# Patient Record
Sex: FEMALE | Race: WHITE | Employment: UNEMPLOYED | URBAN - METROPOLITAN AREA
[De-identification: names, ages, dates, MRNs, and addresses within clinical notes are randomized per-mention and may not be internally consistent; named-entity substitution may affect disease eponyms.]

---

## 2017-02-24 ENCOUNTER — OFFICE VISIT (OUTPATIENT)
Dept: INTERNAL MEDICINE CLINIC | Facility: CLINIC | Age: 52
End: 2017-02-24

## 2017-02-24 VITALS
SYSTOLIC BLOOD PRESSURE: 122 MMHG | DIASTOLIC BLOOD PRESSURE: 78 MMHG | HEART RATE: 78 BPM | WEIGHT: 166 LBS | HEIGHT: 66 IN | RESPIRATION RATE: 16 BRPM | BODY MASS INDEX: 26.68 KG/M2

## 2017-02-24 DIAGNOSIS — E66.9 OBESITY (BMI 30-39.9): ICD-10-CM

## 2017-02-24 DIAGNOSIS — Z51.81 THERAPEUTIC DRUG MONITORING: Primary | ICD-10-CM

## 2017-02-24 PROCEDURE — 99213 OFFICE O/P EST LOW 20 MIN: CPT | Performed by: INTERNAL MEDICINE

## 2017-02-24 RX ORDER — PHENTERMINE HYDROCHLORIDE 37.5 MG/1
37.5 TABLET ORAL
Qty: 30 TABLET | Refills: 0 | Status: SHIPPED | OUTPATIENT
Start: 2017-02-24 | End: 2017-03-21

## 2017-02-24 NOTE — PROGRESS NOTES
CC: Patient presents with:  Weight Check: up 10 lb       HPI:   Obesity, doing well on phentermine, contrave and topamax. No chest pain, worsening hunger.        Current Outpatient Prescriptions:  Phentermine HCl 37.5 MG Oral Tab Take 1 tablet (37.5 m Phentermine HCl 37.5 MG Oral Tab 30 tablet 0      Sig: Take 1 tablet (37.5 mg total) by mouth every morning before breakfast.          None     ASSESSMENT:   Therapeutic drug monitoring  (primary encounter diagnosis)  Obesity (BMI 30-39. 9)     PLAN:  1.  Ob

## 2017-03-21 ENCOUNTER — OFFICE VISIT (OUTPATIENT)
Dept: INTERNAL MEDICINE CLINIC | Facility: CLINIC | Age: 52
End: 2017-03-21

## 2017-03-21 VITALS
RESPIRATION RATE: 16 BRPM | HEIGHT: 66 IN | BODY MASS INDEX: 26.2 KG/M2 | DIASTOLIC BLOOD PRESSURE: 68 MMHG | SYSTOLIC BLOOD PRESSURE: 118 MMHG | WEIGHT: 163 LBS | HEART RATE: 78 BPM

## 2017-03-21 DIAGNOSIS — G47.33 OSA (OBSTRUCTIVE SLEEP APNEA): ICD-10-CM

## 2017-03-21 DIAGNOSIS — Z51.81 ENCOUNTER FOR THERAPEUTIC DRUG MONITORING: Primary | ICD-10-CM

## 2017-03-21 DIAGNOSIS — E66.9 OBESITY (BMI 30-39.9): ICD-10-CM

## 2017-03-21 PROCEDURE — 99213 OFFICE O/P EST LOW 20 MIN: CPT | Performed by: INTERNAL MEDICINE

## 2017-03-21 RX ORDER — PHENTERMINE HYDROCHLORIDE 37.5 MG/1
37.5 TABLET ORAL
Qty: 30 TABLET | Refills: 0 | Status: SHIPPED | OUTPATIENT
Start: 2017-03-21 | End: 2017-04-28

## 2017-03-21 NOTE — PROGRESS NOTES
CC: Patient presents with:  Weight Check: down 3 lb       HPI:   Obesity, doing well on phentermine, topamax and contrave. No chest pain.        Current Outpatient Prescriptions:  Phentermine HCl 37.5 MG Oral Tab Take 1 tablet (37.5 mg total) by mouth Take 1 tablet (37.5 mg total) by mouth every morning before breakfast.      Naltrexone-Bupropion HCl ER (CONTRAVE) 8-90 MG Oral Tablet 12 Hr 120 tablet 5      Sig: Take 2 tablets by mouth 2 (two) times daily.           None     ASSESSMENT:   Encounter for t

## 2017-04-28 ENCOUNTER — OFFICE VISIT (OUTPATIENT)
Dept: INTERNAL MEDICINE CLINIC | Facility: CLINIC | Age: 52
End: 2017-04-28

## 2017-04-28 VITALS
SYSTOLIC BLOOD PRESSURE: 124 MMHG | BODY MASS INDEX: 24.91 KG/M2 | HEART RATE: 78 BPM | WEIGHT: 155 LBS | RESPIRATION RATE: 16 BRPM | DIASTOLIC BLOOD PRESSURE: 68 MMHG | HEIGHT: 66 IN

## 2017-04-28 DIAGNOSIS — Z51.81 ENCOUNTER FOR THERAPEUTIC DRUG MONITORING: Primary | ICD-10-CM

## 2017-04-28 DIAGNOSIS — I10 ESSENTIAL HYPERTENSION: ICD-10-CM

## 2017-04-28 DIAGNOSIS — E66.9 OBESITY (BMI 30-39.9): ICD-10-CM

## 2017-04-28 DIAGNOSIS — G47.33 OSA (OBSTRUCTIVE SLEEP APNEA): ICD-10-CM

## 2017-04-28 PROCEDURE — 99213 OFFICE O/P EST LOW 20 MIN: CPT | Performed by: INTERNAL MEDICINE

## 2017-04-28 RX ORDER — TOPIRAMATE 50 MG/1
50 TABLET, FILM COATED ORAL 2 TIMES DAILY
Qty: 60 TABLET | Refills: 5 | Status: SHIPPED | OUTPATIENT
Start: 2017-04-28 | End: 2017-08-25

## 2017-04-28 RX ORDER — PHENTERMINE HYDROCHLORIDE 37.5 MG/1
37.5 TABLET ORAL
Qty: 30 TABLET | Refills: 0 | Status: SHIPPED | OUTPATIENT
Start: 2017-04-28 | End: 2017-08-25

## 2017-04-28 NOTE — PROGRESS NOTES
CC: Patient presents with:  Weight Check: down 8 lb       HPI:   1. Obesity, doing well on phentermine, contrave and topamax. No chest pain. Working out well. Current Outpatient Prescriptions:  Phentermine HCl 37.5 MG Oral Tab Take 1 tablet (37. times daily. None     ASSESSMENT:   Encounter for therapeutic drug monitoring  (primary encounter diagnosis)  Obesity (BMI 30-39.9)  SHA (obstructive sleep apnea)  Essential hypertension     PLAN:  1.  Obesity (BMI 30-39.9)/SHA, pt with 11 lbs wt l

## 2017-05-25 ENCOUNTER — OFFICE VISIT (OUTPATIENT)
Dept: INTERNAL MEDICINE CLINIC | Facility: CLINIC | Age: 52
End: 2017-05-25

## 2017-05-25 VITALS
RESPIRATION RATE: 16 BRPM | WEIGHT: 151 LBS | BODY MASS INDEX: 24.27 KG/M2 | HEART RATE: 68 BPM | DIASTOLIC BLOOD PRESSURE: 70 MMHG | HEIGHT: 66 IN | SYSTOLIC BLOOD PRESSURE: 124 MMHG

## 2017-05-25 DIAGNOSIS — E66.9 OBESITY (BMI 30-39.9): ICD-10-CM

## 2017-05-25 DIAGNOSIS — G47.33 OSA (OBSTRUCTIVE SLEEP APNEA): ICD-10-CM

## 2017-05-25 DIAGNOSIS — Z51.81 ENCOUNTER FOR THERAPEUTIC DRUG MONITORING: Primary | ICD-10-CM

## 2017-05-25 PROCEDURE — 99213 OFFICE O/P EST LOW 20 MIN: CPT | Performed by: INTERNAL MEDICINE

## 2017-05-25 RX ORDER — PHENTERMINE HYDROCHLORIDE 15 MG/1
15 CAPSULE ORAL EVERY MORNING
Qty: 30 CAPSULE | Refills: 2 | Status: SHIPPED | OUTPATIENT
Start: 2017-05-25 | End: 2017-10-03

## 2017-05-25 NOTE — PROGRESS NOTES
CC: Patient presents with:  Weight Check: down 4 lb       HPI:   Obesity, doing well on phentermine, contrave and topamax. Working out great. No chest pain.        Current Outpatient Prescriptions:  Phentermine HCl 15 MG Oral Cap Take 1 capsule (15 mg None     ASSESSMENT:   Encounter for therapeutic drug monitoring  (primary encounter diagnosis)  Obesity (BMI 30-39.9)  SHA (obstructive sleep apnea)     PLAN:  1.  Obesity (BMI 30-39.9)/SHA, pt with 15 lbs wt loss on 3 months of phentermine 37.5mg, contr

## 2017-08-25 ENCOUNTER — OFFICE VISIT (OUTPATIENT)
Dept: INTERNAL MEDICINE CLINIC | Facility: CLINIC | Age: 52
End: 2017-08-25

## 2017-08-25 VITALS
SYSTOLIC BLOOD PRESSURE: 120 MMHG | DIASTOLIC BLOOD PRESSURE: 80 MMHG | HEART RATE: 78 BPM | WEIGHT: 158 LBS | RESPIRATION RATE: 16 BRPM | BODY MASS INDEX: 25.39 KG/M2 | HEIGHT: 66 IN

## 2017-08-25 DIAGNOSIS — Z51.81 ENCOUNTER FOR THERAPEUTIC DRUG MONITORING: Primary | ICD-10-CM

## 2017-08-25 DIAGNOSIS — E66.9 OBESITY (BMI 30-39.9): ICD-10-CM

## 2017-08-25 PROCEDURE — 99213 OFFICE O/P EST LOW 20 MIN: CPT | Performed by: INTERNAL MEDICINE

## 2017-08-25 RX ORDER — TOPIRAMATE 50 MG/1
50 TABLET, FILM COATED ORAL 2 TIMES DAILY
Qty: 60 TABLET | Refills: 5 | Status: SHIPPED | OUTPATIENT
Start: 2017-08-25 | End: 2018-01-31

## 2017-08-25 RX ORDER — PHENTERMINE HYDROCHLORIDE 37.5 MG/1
37.5 TABLET ORAL
Qty: 30 TABLET | Refills: 0 | Status: SHIPPED | OUTPATIENT
Start: 2017-08-25 | End: 2017-09-19

## 2017-08-25 NOTE — PROGRESS NOTES
CC: Patient presents with:  Weight Check: up 7 lb       HPI:   Obesity, doing well on phentermine, contrave and topamax. Worsening hunger and cravings, went on vacation to [place].        Current Outpatient Prescriptions:  topiramate 50 MG Oral Tab [facility] tablet 5      Sig: Take 1 tablet (50 mg total) by mouth 2 (two) times daily. Naltrexone-Bupropion HCl ER (CONTRAVE) 8-90 MG Oral Tablet 12 Hr 120 tablet 5      Sig: Take 2 tablets by mouth 2 (two) times daily.       Phentermine HCl 37.5 MG Oral Tab 30

## 2017-09-19 DIAGNOSIS — E66.9 OBESITY (BMI 30-39.9): ICD-10-CM

## 2017-09-19 DIAGNOSIS — Z51.81 ENCOUNTER FOR THERAPEUTIC DRUG MONITORING: ICD-10-CM

## 2017-09-20 NOTE — TELEPHONE ENCOUNTER
Requesting Phentermine HCl 37.5 MG   LOV: 8/25/17  RTC: 4 weeks  Last Labs:   Filled: 8/25/17 #30 with 0 refills    Future Appointments  Date Time Provider Francisco Javier Foster   10/3/2017 11:00 AM [facility]     Last seen by

## 2017-09-21 NOTE — TELEPHONE ENCOUNTER
Verbal given to pharmacy. Pended as phone in for documentation purposes.      Time started: 1014    Time ended: 1015    Total time spent on chart: 1 min

## 2017-09-22 RX ORDER — PHENTERMINE HYDROCHLORIDE 37.5 MG/1
TABLET ORAL
Qty: 30 TABLET | Refills: 0 | OUTPATIENT
Start: 2017-09-22 | End: 2017-10-03

## 2017-10-03 ENCOUNTER — OFFICE VISIT (OUTPATIENT)
Dept: INTERNAL MEDICINE CLINIC | Facility: CLINIC | Age: 52
End: 2017-10-03

## 2017-10-03 VITALS
BODY MASS INDEX: 24.91 KG/M2 | RESPIRATION RATE: 16 BRPM | HEART RATE: 68 BPM | DIASTOLIC BLOOD PRESSURE: 90 MMHG | SYSTOLIC BLOOD PRESSURE: 132 MMHG | HEIGHT: 66 IN | WEIGHT: 155 LBS

## 2017-10-03 DIAGNOSIS — Z51.81 ENCOUNTER FOR THERAPEUTIC DRUG MONITORING: Primary | ICD-10-CM

## 2017-10-03 DIAGNOSIS — E03.9 HYPOTHYROIDISM, UNSPECIFIED TYPE: ICD-10-CM

## 2017-10-03 DIAGNOSIS — I10 ESSENTIAL HYPERTENSION: ICD-10-CM

## 2017-10-03 DIAGNOSIS — E66.9 OBESITY (BMI 30-39.9): ICD-10-CM

## 2017-10-03 PROCEDURE — 99214 OFFICE O/P EST MOD 30 MIN: CPT | Performed by: NURSE PRACTITIONER

## 2017-10-03 RX ORDER — PHENTERMINE HYDROCHLORIDE 37.5 MG/1
37.5 TABLET ORAL
Qty: 30 TABLET | Refills: 0 | Status: SHIPPED | OUTPATIENT
Start: 2017-10-03 | End: 2017-11-01

## 2017-10-03 NOTE — PROGRESS NOTES
Bernardo Martinez is a 46year old female presents today for follow-up on medical weight loss program for the treatment of overweight, obesity, or morbid obesity. Previous patient of Dr. Cassandra Brennan and [facility] client since 1/12/2016.     Patient has lost 1# since LOV 1 intact  PSYCH: pleasant, cooperative, normal mood and affect    ASSESSMENT AND PLAN:  Encounter for therapeutic drug monitoring  (primary encounter diagnosis)  Obesity (bmi 30-39. 9)  Essential hypertension  Hypothyroidism, unspecified type    No orders of Sherley Rollins [phone] [email or web address]. [employer]ur[employer]rex  · 360FIT Livia    Online  · Fitness  on Curiously in 10 DVD series  www. [employer]. [employer]    Apps  · Aaptiv  · [place] 7 Minute Workout    Nutrition  · MyFitness Pal  · LoseIT!   · FitFoundation (hea different from when you were younger. An increase in visceral (abdominal) fat is linked to an increase in insulin resistance, diabetes, and inflammatory diseases.     Another factor contributing to weight gain in perimenopause may be the increased appetite friend, ordering the lighter portion when available, or put half in the takeout box right away. Swap out dessert for fruit or yogurt. Reduce carbs.  Cut back on carbs in order to reduce the increase in belly fat, which drives metabolic problems   Add fibe

## 2017-11-01 ENCOUNTER — OFFICE VISIT (OUTPATIENT)
Dept: INTERNAL MEDICINE CLINIC | Facility: CLINIC | Age: 52
End: 2017-11-01

## 2017-11-01 VITALS
HEIGHT: 66 IN | RESPIRATION RATE: 16 BRPM | HEART RATE: 80 BPM | BODY MASS INDEX: 24.75 KG/M2 | DIASTOLIC BLOOD PRESSURE: 80 MMHG | SYSTOLIC BLOOD PRESSURE: 140 MMHG | WEIGHT: 154 LBS

## 2017-11-01 DIAGNOSIS — I10 ESSENTIAL HYPERTENSION: ICD-10-CM

## 2017-11-01 DIAGNOSIS — Z51.81 ENCOUNTER FOR THERAPEUTIC DRUG MONITORING: Primary | ICD-10-CM

## 2017-11-01 DIAGNOSIS — E66.9 OBESITY (BMI 30-39.9): ICD-10-CM

## 2017-11-01 PROCEDURE — 99213 OFFICE O/P EST LOW 20 MIN: CPT | Performed by: NURSE PRACTITIONER

## 2017-11-01 RX ORDER — PHENTERMINE HYDROCHLORIDE 37.5 MG/1
37.5 TABLET ORAL
Qty: 30 TABLET | Refills: 0 | Status: SHIPPED | OUTPATIENT
Start: 2017-11-01 | End: 2017-11-29

## 2017-11-01 RX ORDER — METFORMIN HYDROCHLORIDE 750 MG/1
750 TABLET, EXTENDED RELEASE ORAL DAILY
Qty: 30 TABLET | Refills: 1 | Status: SHIPPED | OUTPATIENT
Start: 2017-11-01 | End: 2017-11-29

## 2017-11-01 NOTE — PATIENT INSTRUCTIONS
Congratulations on your 1# weight loss! Continue making lifestyle changes that focus on good nutrition, regular exercise and stress management. Medication Plan: Continue phentermine and Contrave at current dose.  Add Metformin  mg daily with meal. time to exercise—it is your life!   Feel good about yourself  Do you eat more because you feel bad about yourself, then feel even worse as you gain weight? This is a “vicious cycle.” Breaking this cycle is not easy.  You may need group support or counseling

## 2017-11-01 NOTE — PROGRESS NOTES
Brittany Chauhan is a 46year old female presents today for follow-up on medical weight loss program for the treatment of overweight, obesity, or morbid obesity with associated HTN.     S:  Current weight Wt Readings from Last 6 Encounters:  11/01/17 : 154 lb Signed Prescriptions Disp Refills    Phentermine HCl 37.5 MG Oral Tab 30 tablet 0      Sig: Take 1 tablet (37.5 mg total) by mouth every morning before breakfast.      Naltrexone-Bupropion HCl ER (CONTRAVE) 8-90 MG Oral Tablet 12 Hr 120 tablet 5      Sig: Barrier 1: I don’t want to deny myself. Barrier Buster: You don’t have to! Moderation is the key:  · Watch portion sizes and know when you're eating more than one serving. · Plan to ask for a doggy bag when you eat out. · Have just one.   · Choose lower- © 5492-5219 [address]. [address] [facility], [facility] [place] [facility]. All rights reserved. This information is not intended as a substitute for professional medical care. Always follow your healthcare professional's instructions.             Med

## 2017-11-08 ENCOUNTER — TELEPHONE (OUTPATIENT)
Dept: INTERNAL MEDICINE CLINIC | Facility: CLINIC | Age: 52
End: 2017-11-08

## 2017-11-08 NOTE — TELEPHONE ENCOUNTER
Received documentation from [address] [place], [place] [address] stating jane needs a prior authorization. PA submitted to [facility]. Your demographic data has been sent to [facility] successfully.  They will respond shortly with your clinical questions and you will be notified by

## 2017-11-09 NOTE — TELEPHONE ENCOUNTER
Received documentation from [facility] that Contrave was approved. Medication was approved from 11/9/17-11/9/20. Called and notified Alejandra Munroe. Copy of approval put in blue folder and sent to scan.

## 2017-11-09 NOTE — TELEPHONE ENCOUNTER
Questions were filled out on CMM and submitted. Your information has been submitted to [facility]. To check for an updated outcome later, reopen this PA request from your dashboard.   If [facility] has not responded to your request within 24 hours, contact Car

## 2017-11-29 ENCOUNTER — OFFICE VISIT (OUTPATIENT)
Dept: INTERNAL MEDICINE CLINIC | Facility: CLINIC | Age: 52
End: 2017-11-29

## 2017-11-29 VITALS
WEIGHT: 152 LBS | DIASTOLIC BLOOD PRESSURE: 90 MMHG | BODY MASS INDEX: 24.43 KG/M2 | RESPIRATION RATE: 16 BRPM | HEIGHT: 66 IN | HEART RATE: 80 BPM | SYSTOLIC BLOOD PRESSURE: 130 MMHG

## 2017-11-29 DIAGNOSIS — E66.9 OBESITY (BMI 30-39.9): ICD-10-CM

## 2017-11-29 DIAGNOSIS — Z51.81 ENCOUNTER FOR THERAPEUTIC DRUG MONITORING: Primary | ICD-10-CM

## 2017-11-29 PROCEDURE — 99213 OFFICE O/P EST LOW 20 MIN: CPT | Performed by: NURSE PRACTITIONER

## 2017-11-29 RX ORDER — METFORMIN HYDROCHLORIDE 750 MG/1
1500 TABLET, EXTENDED RELEASE ORAL
Qty: 60 TABLET | Refills: 1 | Status: SHIPPED | OUTPATIENT
Start: 2017-11-29 | End: 2018-01-31

## 2017-11-29 RX ORDER — PHENTERMINE HYDROCHLORIDE 37.5 MG/1
37.5 TABLET ORAL
Qty: 30 TABLET | Refills: 0 | Status: CANCELLED | OUTPATIENT
Start: 2017-11-29

## 2017-11-29 RX ORDER — PHENTERMINE HYDROCHLORIDE 15 MG/1
15 CAPSULE ORAL EVERY MORNING
Qty: 30 CAPSULE | Refills: 1 | Status: SHIPPED | OUTPATIENT
Start: 2017-11-29 | End: 2018-01-31

## 2017-11-29 NOTE — PATIENT INSTRUCTIONS
Congratulations on your 2# weight loss! Continue making lifestyle changes that focus on good nutrition, regular exercise and stress management.     Medication Plan: Continue current medication regimen aside from reduce phentermine to 15 mg daily and increas that you can stick to your plan and meet your goals:  · If you don’t meet a goal, don’t use it as an excuse to give up on your whole plan. Adjust your goal and try again.   · Try to understand your own attitude about food.  Are you subject to emotional eati

## 2017-11-29 NOTE — PROGRESS NOTES
Princess Sosa is a 46year old female presents today for follow-up on medical weight loss program for the treatment of overweight, obesity, or morbid obesity with associated HTN.     S:  Current weight Wt Readings from Last 6 Encounters:  11/29/17 : 152 lb Prescriptions Disp Refills    MetFORMIN HCl  MG Oral Tablet 24 Hr 60 tablet 1      Sig: Take 2 tablets (1,500 mg total) by mouth daily with breakfast.      Phentermine HCl 15 MG Oral Cap 30 capsule 1      Sig: Take 1 capsule (15 mg total) by mouth ev keep you motivated:   · Remind yourself of your goals. Post them near the refrigerator or desk where you work. · Make daily entries in your diary or journal about your activity and eating.  A visual reminder of success, like a gold star, can help keep you instructions. Medication use and SEs reviewed with patient. Return in about 2 months (around 1/29/2018) for weight mangement. Patient verbalizes understanding.

## 2018-01-31 ENCOUNTER — OFFICE VISIT (OUTPATIENT)
Dept: INTERNAL MEDICINE CLINIC | Facility: CLINIC | Age: 53
End: 2018-01-31

## 2018-01-31 VITALS
WEIGHT: 154 LBS | HEART RATE: 68 BPM | SYSTOLIC BLOOD PRESSURE: 128 MMHG | DIASTOLIC BLOOD PRESSURE: 70 MMHG | HEIGHT: 66 IN | BODY MASS INDEX: 24.75 KG/M2 | RESPIRATION RATE: 16 BRPM

## 2018-01-31 DIAGNOSIS — Z51.81 ENCOUNTER FOR THERAPEUTIC DRUG MONITORING: Primary | ICD-10-CM

## 2018-01-31 DIAGNOSIS — E66.9 OBESITY (BMI 30-39.9): ICD-10-CM

## 2018-01-31 PROCEDURE — 99213 OFFICE O/P EST LOW 20 MIN: CPT | Performed by: NURSE PRACTITIONER

## 2018-01-31 RX ORDER — TOPIRAMATE 100 MG/1
100 TABLET, FILM COATED ORAL 2 TIMES DAILY
Qty: 60 TABLET | Refills: 5 | Status: SHIPPED | OUTPATIENT
Start: 2018-01-31 | End: 2018-09-17

## 2018-01-31 RX ORDER — METFORMIN HYDROCHLORIDE 750 MG/1
1500 TABLET, EXTENDED RELEASE ORAL
Qty: 60 TABLET | Refills: 1 | Status: SHIPPED | OUTPATIENT
Start: 2018-01-31 | End: 2018-03-12

## 2018-01-31 RX ORDER — PHENTERMINE HYDROCHLORIDE 15 MG/1
15 CAPSULE ORAL EVERY MORNING
Qty: 30 CAPSULE | Refills: 1 | Status: SHIPPED | OUTPATIENT
Start: 2018-01-31 | End: 2018-03-12

## 2018-01-31 NOTE — PROGRESS NOTES
Burgess Elias is a 46year old female presents today for follow-up on medical weight loss program for the treatment of overweight, obesity, or morbid obesity with associated HTN.     S:  Current weight Wt Readings from Last 6 Encounters:  01/31/18 : 154 lb encounter diagnosis)  Obesity (bmi 30-39. 9)    No orders of the defined types were placed in this encounter.       Meds & Refills for this Visit:  Signed Prescriptions Disp Refills    MetFORMIN HCl  MG Oral Tablet 24 Hr 60 tablet 1      Sig: Take 2 ta assistance. Is too little sleep a cause of weight gain? It might be. Recent studies have suggested an association between sleep duration and weight gain.  Sleeping less than five hours — or more than nine hours — a night appears to increase the [employer]

## 2018-01-31 NOTE — PATIENT INSTRUCTIONS
Continue making lifestyle changes that focus on good nutrition, regular exercise and stress management.     Medication Plan: Continue current medication regimen aside from increase Topamax to 100 mg twice a day, start with 50 mg in AM and 100 mg in PM for 1

## 2018-03-12 ENCOUNTER — OFFICE VISIT (OUTPATIENT)
Dept: INTERNAL MEDICINE CLINIC | Facility: CLINIC | Age: 53
End: 2018-03-12

## 2018-03-12 VITALS
RESPIRATION RATE: 16 BRPM | SYSTOLIC BLOOD PRESSURE: 140 MMHG | HEIGHT: 66 IN | WEIGHT: 154 LBS | HEART RATE: 68 BPM | DIASTOLIC BLOOD PRESSURE: 90 MMHG | BODY MASS INDEX: 24.75 KG/M2

## 2018-03-12 DIAGNOSIS — E66.9 OBESITY (BMI 30-39.9): ICD-10-CM

## 2018-03-12 DIAGNOSIS — I10 ESSENTIAL HYPERTENSION: ICD-10-CM

## 2018-03-12 DIAGNOSIS — Z51.81 ENCOUNTER FOR THERAPEUTIC DRUG MONITORING: Primary | ICD-10-CM

## 2018-03-12 PROCEDURE — 99213 OFFICE O/P EST LOW 20 MIN: CPT | Performed by: NURSE PRACTITIONER

## 2018-03-12 RX ORDER — METFORMIN HYDROCHLORIDE 750 MG/1
1500 TABLET, EXTENDED RELEASE ORAL
Qty: 60 TABLET | Refills: 5 | Status: SHIPPED | OUTPATIENT
Start: 2018-03-12 | End: 2018-09-17

## 2018-03-12 RX ORDER — PHENTERMINE HYDROCHLORIDE 15 MG/1
15 CAPSULE ORAL EVERY MORNING
Qty: 30 CAPSULE | Refills: 1 | Status: SHIPPED | OUTPATIENT
Start: 2018-03-12 | End: 2018-05-17

## 2018-03-12 NOTE — PROGRESS NOTES
Mindi Forte is a 46year old female presents today for follow-up on medical weight loss program for the treatment of overweight, obesity, or morbid obesity with associated HTN.     S:  Current weight Wt Readings from Last 6 Encounters:  03/12/18 : 154 lb Take 2 tablets (1,500 mg total) by mouth daily with breakfast.      Lorcaserin HCl ER (BELVIQ XR) 20 MG Oral Tablet 24 Hr 30 tablet 1      Sig: Take 1 tablet by mouth every morning.       Phentermine HCl 15 MG Oral Cap 30 capsule 1      Sig: Take 1 capsule

## 2018-03-12 NOTE — PATIENT INSTRUCTIONS
Continue making lifestyle changes that focus on good nutrition, regular exercise and stress management. Medication Plan: Continue current medication regimen. Agreed upon goal/s before next office visit include: Great job keeping self care present!  BP

## 2018-05-08 ENCOUNTER — TELEPHONE (OUTPATIENT)
Dept: INTERNAL MEDICINE CLINIC | Facility: CLINIC | Age: 53
End: 2018-05-08

## 2018-05-08 NOTE — TELEPHONE ENCOUNTER
Patient called at 1005am for her 11am appt - pt has to cx   Pt was called to  son from school - son is sick   Pt r/s for 5/15    No show charge?    Last no show was with EVER SAWYER [facility] 5/2017

## 2018-05-17 ENCOUNTER — OFFICE VISIT (OUTPATIENT)
Dept: INTERNAL MEDICINE CLINIC | Facility: CLINIC | Age: 53
End: 2018-05-17

## 2018-05-17 VITALS
SYSTOLIC BLOOD PRESSURE: 130 MMHG | BODY MASS INDEX: 24.91 KG/M2 | DIASTOLIC BLOOD PRESSURE: 90 MMHG | WEIGHT: 155 LBS | HEIGHT: 66 IN | HEART RATE: 80 BPM | RESPIRATION RATE: 16 BRPM

## 2018-05-17 DIAGNOSIS — I10 ESSENTIAL HYPERTENSION: ICD-10-CM

## 2018-05-17 DIAGNOSIS — L65.9 HAIR THINNING: ICD-10-CM

## 2018-05-17 DIAGNOSIS — E66.9 OBESITY (BMI 30-39.9): ICD-10-CM

## 2018-05-17 DIAGNOSIS — E03.9 HYPOTHYROIDISM, UNSPECIFIED TYPE: ICD-10-CM

## 2018-05-17 DIAGNOSIS — Z51.81 ENCOUNTER FOR THERAPEUTIC DRUG MONITORING: Primary | ICD-10-CM

## 2018-05-17 PROCEDURE — 99214 OFFICE O/P EST MOD 30 MIN: CPT | Performed by: NURSE PRACTITIONER

## 2018-05-17 RX ORDER — PHENTERMINE HYDROCHLORIDE 15 MG/1
15 CAPSULE ORAL EVERY MORNING
Qty: 30 CAPSULE | Refills: 1 | Status: SHIPPED | OUTPATIENT
Start: 2018-05-17 | End: 2018-09-17

## 2018-05-17 NOTE — PROGRESS NOTES
Gregory Browning is a 46year old female presents today for follow-up on medical weight loss program for the treatment of overweight, obesity, or morbid obesity with associated HTN.     S:  Current weight Wt Readings from Last 6 Encounters:  05/17/18 : 155 lb hypertension  Hypothyroidism, unspecified type  Hair thinning      Orders Placed This Encounter      COMP METABOLIC PANEL [20357]      TSH and Free T4      Vitamin B12      Vitamin D, 25-Hydroxy      Ferritin [E]    Meds & Refills for this Visit:  Signed P instructions below for additional plans and patient counseling. Patient Instructions   Continue making lifestyle changes that focus on good nutrition, regular exercise and stress management.     Medication Plan: Continue current medication regimen as including adrenaline, CRH, and cortisol. Your brain and body prepare to handle the threat by making you feel alert, ready for action and able to withstand an injury.  In the short-term, adrenaline helps you feel less hungry as your blood flows away from the mental and physical work dealing with the threat. Anxiety  When we have a surge of adrenaline as part of our fight/flight response, we get fidgety and activated. Adrenaline is the reason for the “wired up” feeling we get when we’re stressed.  While we may countries, and they also work more hours. Working in urban areas may mean long, jammed commutes, which both increase stress and interfere with willpower because we are hungrier when we get home later.  [address] research study showed, in Mindfully  Mindful Eating programs train you in meditation, which helps you cope with stress, and change your consciousness around eating. You learn to slow down and tune in to your sensory experience of the food, including its sight, texture or smell.  You

## 2018-05-17 NOTE — PATIENT INSTRUCTIONS
Continue making lifestyle changes that focus on good nutrition, regular exercise and stress management. Medication Plan: Continue current medication regimen aside from wean off of Contrave:  Take 2 tabs in AM, 1 tab in PM x5 days, then 1 tab twice a day making you feel alert, ready for action and able to withstand an injury.  In the short-term, adrenaline helps you feel less hungry as your blood flows away from the internal organs and to your large muscles to prepare for “fight or flight.” However, once th part of our fight/flight response, we get fidgety and activated. Adrenaline is the reason for the “wired up” feeling we get when we’re stressed.  While we may burn off some extra calories fidgeting or running around cleaning because we can’t sit still, anxi both increase stress and interfere with willpower because we are hungrier when we get home later.  [address] research study showed, in laboratory mice, that being “stressed” by exposure to the smell of a predator lead the mice to eat more your consciousness around eating. You learn to slow down and tune in to your sensory experience of the food, including its sight, texture or smell.  You also learn to tune into your subjective feelings of hunger or fullness, rather than eating just because

## 2018-05-18 ENCOUNTER — TELEPHONE (OUTPATIENT)
Dept: INTERNAL MEDICINE CLINIC | Facility: CLINIC | Age: 53
End: 2018-05-18

## 2018-05-18 NOTE — TELEPHONE ENCOUNTER
PA request for Tee requested, PA submitted to [facility]. Awaiting approval or denial.  Your demographic data has been sent to the plan successfully.  They will respond with your clinical questions and you will be notified by email when available within t

## 2018-05-22 ENCOUNTER — TELEPHONE (OUTPATIENT)
Dept: INTERNAL MEDICINE CLINIC | Facility: CLINIC | Age: 53
End: 2018-05-22

## 2018-05-22 NOTE — TELEPHONE ENCOUNTER
Plan has responded with another question set for the PA for saxenda that expires in 1 month    Key: [id number]

## 2018-05-22 NOTE — TELEPHONE ENCOUNTER
Plan has responded with another question set for the PA for saxenda that expires in 1 month    Wu: Kristin Renteria called [phone]

## 2018-05-23 NOTE — TELEPHONE ENCOUNTER
Additional questions answered and submitted for PA. They wanted to know if patient had a BMI equal or greater then 30 or a BMI of 27. Answered no to both questions. Patient has a BMI of 25.  Awaiting approval or denial. Your information has been submitted t

## 2018-05-23 NOTE — TELEPHONE ENCOUNTER
Received documentation from [facility] that Nilay Cid was denied. Medication denied due to patient not meeting criteria of having a BMI of 30 or greater or having a BMI of 27 or more and have risk factors. Letter sent to scan and copy in folder. Please advise.

## 2018-05-29 NOTE — TELEPHONE ENCOUNTER
Called and left detailed message to check with her insurance to see if Victoza or Bydureon is covered by her insurance since [address] is not

## 2018-09-07 ENCOUNTER — LAB ENCOUNTER (OUTPATIENT)
Dept: LAB | Age: 53
End: 2018-09-07
Attending: NURSE PRACTITIONER
Payer: COMMERCIAL

## 2018-09-07 DIAGNOSIS — E66.9 OBESITY, UNSPECIFIED: Primary | ICD-10-CM

## 2018-09-07 LAB
ALBUMIN SERPL-MCNC: 4 G/DL (ref 3.5–4.8)
ALBUMIN/GLOB SERPL: 1.1 {RATIO} (ref 1–2)
ALP LIVER SERPL-CCNC: 71 U/L (ref 41–108)
ALT SERPL-CCNC: 29 U/L (ref 14–54)
ANION GAP SERPL CALC-SCNC: 9 MMOL/L (ref 0–18)
AST SERPL-CCNC: 22 U/L (ref 15–41)
BILIRUB SERPL-MCNC: 0.7 MG/DL (ref 0.1–2)
BUN BLD-MCNC: 19 MG/DL (ref 8–20)
BUN/CREAT SERPL: 14.6 (ref 10–20)
CALCIUM BLD-MCNC: 9.4 MG/DL (ref 8.3–10.3)
CHLORIDE SERPL-SCNC: 106 MMOL/L (ref 101–111)
CO2 SERPL-SCNC: 26 MMOL/L (ref 22–32)
CREAT BLD-MCNC: 1.3 MG/DL (ref 0.55–1.02)
DEPRECATED HBV CORE AB SER IA-ACNC: 16 NG/ML (ref 18–340)
GLOBULIN PLAS-MCNC: 3.5 G/DL (ref 2.5–4)
GLUCOSE BLD-MCNC: 90 MG/DL (ref 70–99)
HAV AB SERPL IA-ACNC: 296 PG/ML (ref 193–986)
M PROTEIN MFR SERPL ELPH: 7.5 G/DL (ref 6.1–8.3)
OSMOLALITY SERPL CALC.SUM OF ELEC: 294 MOSM/KG (ref 275–295)
POTASSIUM SERPL-SCNC: 4.5 MMOL/L (ref 3.6–5.1)
SODIUM SERPL-SCNC: 141 MMOL/L (ref 136–144)
T4 FREE SERPL-MCNC: 0.7 NG/DL (ref 0.9–1.8)
TSI SER-ACNC: 24.2 MIU/ML (ref 0.35–5.5)
VIT D+METAB SERPL-MCNC: 18.1 NG/ML (ref 30–100)

## 2018-09-07 PROCEDURE — 84443 ASSAY THYROID STIM HORMONE: CPT

## 2018-09-07 PROCEDURE — 82607 VITAMIN B-12: CPT

## 2018-09-07 PROCEDURE — 84439 ASSAY OF FREE THYROXINE: CPT

## 2018-09-07 PROCEDURE — 82306 VITAMIN D 25 HYDROXY: CPT

## 2018-09-07 PROCEDURE — 80053 COMPREHEN METABOLIC PANEL: CPT

## 2018-09-07 PROCEDURE — 82728 ASSAY OF FERRITIN: CPT

## 2018-09-12 ENCOUNTER — TELEPHONE (OUTPATIENT)
Dept: INTERNAL MEDICINE CLINIC | Facility: CLINIC | Age: 53
End: 2018-09-12

## 2018-09-12 RX ORDER — ERGOCALCIFEROL 1.25 MG/1
50000 CAPSULE ORAL WEEKLY
Qty: 4 CAPSULE | Refills: 5 | Status: SHIPPED | OUTPATIENT
Start: 2018-09-12 | End: 2018-12-05

## 2018-09-17 ENCOUNTER — OFFICE VISIT (OUTPATIENT)
Dept: INTERNAL MEDICINE CLINIC | Facility: CLINIC | Age: 53
End: 2018-09-17
Payer: COMMERCIAL

## 2018-09-17 VITALS
BODY MASS INDEX: 27.58 KG/M2 | TEMPERATURE: 98 F | WEIGHT: 171.63 LBS | HEART RATE: 60 BPM | RESPIRATION RATE: 16 BRPM | HEIGHT: 66 IN | SYSTOLIC BLOOD PRESSURE: 126 MMHG | DIASTOLIC BLOOD PRESSURE: 80 MMHG

## 2018-09-17 DIAGNOSIS — Z51.81 ENCOUNTER FOR THERAPEUTIC DRUG MONITORING: Primary | ICD-10-CM

## 2018-09-17 DIAGNOSIS — R79.0 LOW IRON STORES: ICD-10-CM

## 2018-09-17 DIAGNOSIS — E66.9 OBESITY (BMI 30-39.9): ICD-10-CM

## 2018-09-17 DIAGNOSIS — E53.8 VITAMIN B12 DEFICIENCY: ICD-10-CM

## 2018-09-17 DIAGNOSIS — E55.9 VITAMIN D DEFICIENCY: ICD-10-CM

## 2018-09-17 PROCEDURE — 96372 THER/PROPH/DIAG INJ SC/IM: CPT | Performed by: NURSE PRACTITIONER

## 2018-09-17 PROCEDURE — 99214 OFFICE O/P EST MOD 30 MIN: CPT | Performed by: NURSE PRACTITIONER

## 2018-09-17 RX ORDER — METFORMIN HYDROCHLORIDE 750 MG/1
1500 TABLET, EXTENDED RELEASE ORAL
Qty: 60 TABLET | Refills: 5 | Status: CANCELLED | OUTPATIENT
Start: 2018-09-17

## 2018-09-17 RX ORDER — TOPIRAMATE 100 MG/1
100 TABLET, FILM COATED ORAL 2 TIMES DAILY
Qty: 60 TABLET | Refills: 5 | Status: CANCELLED | OUTPATIENT
Start: 2018-09-17

## 2018-09-17 RX ORDER — CYANOCOBALAMIN 1000 UG/ML
1000 INJECTION INTRAMUSCULAR; SUBCUTANEOUS ONCE
Status: COMPLETED | OUTPATIENT
Start: 2018-09-17 | End: 2018-09-17

## 2018-09-17 RX ORDER — PHENTERMINE HYDROCHLORIDE 15 MG/1
15 CAPSULE ORAL EVERY MORNING
Qty: 30 CAPSULE | Refills: 0 | Status: SHIPPED | OUTPATIENT
Start: 2018-09-17 | End: 2018-10-18

## 2018-09-17 RX ADMIN — CYANOCOBALAMIN 1000 MCG: 1000 INJECTION INTRAMUSCULAR; SUBCUTANEOUS at 10:53:00

## 2018-09-17 NOTE — PATIENT INSTRUCTIONS
Continue making lifestyle changes that focus on good nutrition, regular exercise and stress management. Medication Plan: Resume phentermine daily. Continue with weekly Vitamin D, start Vitamin B12 supplementation today with monthly injection.     Agreed

## 2018-09-17 NOTE — PROGRESS NOTES
Ceci Felder is a 46year old female presents today for follow-up on medical weight loss program for the treatment of overweight, obesity, or morbid obesity with associated HTN.     S:  Current weight Wt Readings from Last 6 Encounters:  09/17/18 : 171 lb motor and sensory grossly intact  PSYCH: pleasant, cooperative, normal mood and affect    ASSESSMENT AND PLAN:  Encounter for therapeutic drug monitoring  (primary encounter diagnosis)  Obesity (bmi 30-39. 9)  Vitamin b12 deficiency  Low iron stores  Vitami balance of cardio and strength most days of the week, making sure there to  have 2-3x/week of strength training. 4. Eat Whole Foods: A rainbow of Vegetables daily, lean protein, low sugar fruits and whole grains.   5. Read food labels, omiting items that h

## 2018-10-18 ENCOUNTER — OFFICE VISIT (OUTPATIENT)
Dept: INTERNAL MEDICINE CLINIC | Facility: CLINIC | Age: 53
End: 2018-10-18
Payer: COMMERCIAL

## 2018-10-18 VITALS
SYSTOLIC BLOOD PRESSURE: 130 MMHG | WEIGHT: 165 LBS | RESPIRATION RATE: 16 BRPM | HEIGHT: 66 IN | BODY MASS INDEX: 26.52 KG/M2 | DIASTOLIC BLOOD PRESSURE: 80 MMHG | HEART RATE: 68 BPM

## 2018-10-18 DIAGNOSIS — E66.9 OBESITY (BMI 30-39.9): ICD-10-CM

## 2018-10-18 DIAGNOSIS — Z51.81 ENCOUNTER FOR THERAPEUTIC DRUG MONITORING: Primary | ICD-10-CM

## 2018-10-18 DIAGNOSIS — E53.8 VITAMIN B12 DEFICIENCY: ICD-10-CM

## 2018-10-18 PROCEDURE — 96372 THER/PROPH/DIAG INJ SC/IM: CPT | Performed by: NURSE PRACTITIONER

## 2018-10-18 PROCEDURE — 99213 OFFICE O/P EST LOW 20 MIN: CPT | Performed by: NURSE PRACTITIONER

## 2018-10-18 RX ORDER — PHENTERMINE HYDROCHLORIDE 37.5 MG/1
37.5 CAPSULE ORAL EVERY MORNING
Qty: 30 CAPSULE | Refills: 0 | Status: SHIPPED | OUTPATIENT
Start: 2018-10-18 | End: 2018-11-13

## 2018-10-18 RX ORDER — CYANOCOBALAMIN 1000 UG/ML
1000 INJECTION INTRAMUSCULAR; SUBCUTANEOUS ONCE
Status: COMPLETED | OUTPATIENT
Start: 2018-10-18 | End: 2018-10-18

## 2018-10-18 RX ORDER — TOPIRAMATE 25 MG/1
50 TABLET ORAL 2 TIMES DAILY
Qty: 120 TABLET | Refills: 1 | Status: SHIPPED | OUTPATIENT
Start: 2018-10-18 | End: 2018-11-13

## 2018-10-18 RX ADMIN — CYANOCOBALAMIN 1000 MCG: 1000 INJECTION INTRAMUSCULAR; SUBCUTANEOUS at 15:11:00

## 2018-10-18 NOTE — PATIENT INSTRUCTIONS
Continue making lifestyle changes that focus on good nutrition, regular exercise and stress management. Medication Plan: Increase phentermine, add Topamax:  Take 1 tab daily in AM for 1 week, then increase to 1 tab twice a day for 1 week, then increase t

## 2018-10-18 NOTE — PROGRESS NOTES
Brittany Chauhan is a 48year old female presents today for follow-up on medical weight loss program for the treatment of overweight, obesity, or morbid obesity with associated HTN.     S:  Current weight Wt Readings from Last 6 Encounters:  10/18/18 : 165 lb Refills for this Visit:  Requested Prescriptions     Signed Prescriptions Disp Refills   • Phentermine HCl 37.5 MG Oral Cap 30 capsule 0     Sig: Take 1 capsule (37.5 mg total) by mouth every morning.    • topiramate (TOPAMAX) 25 MG Oral Tab 120 tablet 1

## 2018-11-13 ENCOUNTER — OFFICE VISIT (OUTPATIENT)
Dept: INTERNAL MEDICINE CLINIC | Facility: CLINIC | Age: 53
End: 2018-11-13
Payer: COMMERCIAL

## 2018-11-13 VITALS
DIASTOLIC BLOOD PRESSURE: 80 MMHG | HEART RATE: 76 BPM | WEIGHT: 162 LBS | HEIGHT: 66 IN | RESPIRATION RATE: 16 BRPM | SYSTOLIC BLOOD PRESSURE: 120 MMHG | BODY MASS INDEX: 26.03 KG/M2

## 2018-11-13 DIAGNOSIS — Z51.81 ENCOUNTER FOR THERAPEUTIC DRUG MONITORING: Primary | ICD-10-CM

## 2018-11-13 DIAGNOSIS — E66.9 OBESITY (BMI 30-39.9): ICD-10-CM

## 2018-11-13 DIAGNOSIS — E53.8 VITAMIN B12 DEFICIENCY: ICD-10-CM

## 2018-11-13 PROCEDURE — 99213 OFFICE O/P EST LOW 20 MIN: CPT | Performed by: NURSE PRACTITIONER

## 2018-11-13 PROCEDURE — 96372 THER/PROPH/DIAG INJ SC/IM: CPT | Performed by: NURSE PRACTITIONER

## 2018-11-13 RX ORDER — CYANOCOBALAMIN 1000 UG/ML
1000 INJECTION INTRAMUSCULAR; SUBCUTANEOUS ONCE
Status: COMPLETED | OUTPATIENT
Start: 2018-11-13 | End: 2018-11-13

## 2018-11-13 RX ORDER — PHENTERMINE HYDROCHLORIDE 37.5 MG/1
37.5 CAPSULE ORAL EVERY MORNING
Qty: 30 CAPSULE | Refills: 1 | Status: SHIPPED | OUTPATIENT
Start: 2018-11-13 | End: 2019-04-18 | Stop reason: ALTCHOICE

## 2018-11-13 RX ORDER — TOPIRAMATE 100 MG/1
100 TABLET, FILM COATED ORAL 2 TIMES DAILY
Qty: 180 TABLET | Refills: 1 | Status: SHIPPED | OUTPATIENT
Start: 2018-11-13 | End: 2019-04-18 | Stop reason: ALTCHOICE

## 2018-11-13 RX ADMIN — CYANOCOBALAMIN 1000 MCG: 1000 INJECTION INTRAMUSCULAR; SUBCUTANEOUS at 12:21:00

## 2018-11-13 NOTE — PATIENT INSTRUCTIONS
Continue making lifestyle changes that focus on good nutrition, regular exercise and stress management. Medication Plan: Continue current medication regimen aside from increase Topamax to 100 mg twice a day- new script at pharmacy.  Vitamin B12 injection Unknown Chago

## 2018-11-13 NOTE — PROGRESS NOTES
Hallie Chavez is a 48year old female presents today for follow-up on medical weight loss program for the treatment of overweight, obesity, or morbid obesity with associated HTN.     S:  Current weight Wt Readings from Last 6 Encounters:  11/13/18 : 162 lb Refills for this Visit:  Requested Prescriptions     Signed Prescriptions Disp Refills   • Phentermine HCl 37.5 MG Oral Cap 30 capsule 1     Sig: Take 1 capsule (37.5 mg total) by mouth every morning.    • topiramate 100 MG Oral Tab 180 tablet 1     Sig: Ta [phone] and/or email Raji Omalley at [email or web address]    Online Fitness  · Fitness  on Vanna's Vanity in 10 DVD series   www. [email or web address]. Globoforce  · Sit and Be Fit - Chair exercise series www.sitandbefit. org      Apps for on the Node1  · Aapt

## 2018-12-03 PROBLEM — N62 MACROMASTIA: Status: ACTIVE | Noted: 2018-12-03

## 2018-12-03 PROBLEM — M54.9 UPPER BACK PAIN: Status: ACTIVE | Noted: 2018-12-03

## 2019-02-05 ENCOUNTER — OFFICE VISIT (OUTPATIENT)
Dept: INTERNAL MEDICINE CLINIC | Facility: CLINIC | Age: 54
End: 2019-02-05
Payer: COMMERCIAL

## 2019-02-05 VITALS
WEIGHT: 180 LBS | RESPIRATION RATE: 14 BRPM | HEIGHT: 66 IN | SYSTOLIC BLOOD PRESSURE: 160 MMHG | HEART RATE: 88 BPM | DIASTOLIC BLOOD PRESSURE: 90 MMHG | BODY MASS INDEX: 28.93 KG/M2

## 2019-02-05 DIAGNOSIS — E66.9 OBESITY (BMI 30-39.9): ICD-10-CM

## 2019-02-05 DIAGNOSIS — R03.0 ELEVATED BP WITHOUT DIAGNOSIS OF HYPERTENSION: ICD-10-CM

## 2019-02-05 DIAGNOSIS — E53.8 VITAMIN B12 DEFICIENCY: ICD-10-CM

## 2019-02-05 DIAGNOSIS — Z51.81 ENCOUNTER FOR THERAPEUTIC DRUG MONITORING: Primary | ICD-10-CM

## 2019-02-05 PROCEDURE — 99213 OFFICE O/P EST LOW 20 MIN: CPT | Performed by: NURSE PRACTITIONER

## 2019-02-05 RX ORDER — CYANOCOBALAMIN 1000 UG/ML
1000 INJECTION INTRAMUSCULAR; SUBCUTANEOUS ONCE
Status: CANCELLED | OUTPATIENT
Start: 2019-02-05 | End: 2019-02-05

## 2019-02-05 RX ORDER — ERGOCALCIFEROL 1.25 MG/1
CAPSULE ORAL
COMMUNITY
Start: 2019-02-04 | End: 2019-06-27

## 2019-02-05 RX ORDER — PHENTERMINE HYDROCHLORIDE 37.5 MG/1
37.5 CAPSULE ORAL EVERY MORNING
Qty: 30 CAPSULE | Refills: 1 | Status: CANCELLED | OUTPATIENT
Start: 2019-02-05

## 2019-02-05 NOTE — PATIENT INSTRUCTIONS
Continue making lifestyle changes that focus on good nutrition, regular exercise and stress management. Medication Plan: Remain off phentermine.  Restart Topamax, will reintroduce with Trokendi XR 25 mg daily for 7 days, then increase to 50 mg daily for through birth and are passed down through our families   Stress/mood – Our mental health affects our hunger, sleep cycle and other important brain functions   Medications – Many medications are associated with weight gain such as insulin, oral medications for at least seven to eight hours of sleep each night by going to bed earlier, relaxing before bedtime or drinking calming tea in the evenings. Monitor food intake – Be aware of what’s going into your body and how much. Are you controlling your portions?

## 2019-02-05 NOTE — PROGRESS NOTES
Filipe Givens is a 48year old female presents today for follow-up on medical weight loss program for the treatment of overweight, obesity, or morbid obesity with associated HTN.     S:  Current weight Wt Readings from Last 6 Encounters:  02/05/19 : 180 lb B12      Meds & Refills for this Visit:  Requested Prescriptions     Signed Prescriptions Disp Refills   • Topiramate ER (TROKENDI XR) 25 MG Oral Capsule SR 24 Hr 7 capsule 1     Sig: Take 1 capsule by mouth daily.    • Topiramate ER (TROKENDI XR) 50 MG Ora and body size can definitely influence an individual’s decision to lose weight, we may be missing one very important factor that should never go overlooked!   Along the journey with weight and health, it’s also important to understand that our body weight a relationship between weight-loss and risk factor improvement.  Every pound lost reduces our risk of:  Diabetes   Heart disease and stroke   Osteoarthritis   High blood pressure   Breathing problems (such as sleep apnea and asthma)   Gallbladder disease and (around 3/5/2019) for weight mangement. Patient verbalizes understanding.

## 2019-03-11 RX ORDER — ERGOCALCIFEROL 1.25 MG/1
CAPSULE ORAL
Qty: 4 CAPSULE | Refills: 0 | OUTPATIENT
Start: 2019-03-11

## 2019-03-11 NOTE — TELEPHONE ENCOUNTER
Requesting ergocalciferol 30014 units Oral Cap    LOV: 2/5/19  RTC: 1 mth   Last Relevant Labs: 9/7/18  Filled: 9/12/18 #4 with 5 refills    No future appointments. rx denied will discuss at next ov.

## 2019-06-27 ENCOUNTER — OFFICE VISIT (OUTPATIENT)
Dept: INTERNAL MEDICINE CLINIC | Facility: CLINIC | Age: 54
End: 2019-06-27
Payer: COMMERCIAL

## 2019-06-27 VITALS
HEART RATE: 80 BPM | SYSTOLIC BLOOD PRESSURE: 130 MMHG | WEIGHT: 196 LBS | DIASTOLIC BLOOD PRESSURE: 70 MMHG | BODY MASS INDEX: 31.5 KG/M2 | HEIGHT: 66 IN | RESPIRATION RATE: 14 BRPM

## 2019-06-27 DIAGNOSIS — Z51.81 ENCOUNTER FOR THERAPEUTIC DRUG MONITORING: Primary | ICD-10-CM

## 2019-06-27 DIAGNOSIS — I10 BENIGN ESSENTIAL HTN: ICD-10-CM

## 2019-06-27 DIAGNOSIS — E03.9 HYPOTHYROIDISM, UNSPECIFIED TYPE: ICD-10-CM

## 2019-06-27 DIAGNOSIS — E66.9 OBESITY (BMI 30-39.9): ICD-10-CM

## 2019-06-27 PROCEDURE — 99214 OFFICE O/P EST MOD 30 MIN: CPT | Performed by: NURSE PRACTITIONER

## 2019-06-27 RX ORDER — PHENTERMINE HYDROCHLORIDE 37.5 MG/1
37.5 TABLET ORAL EVERY MORNING
Qty: 30 TABLET | Refills: 0 | Status: SHIPPED | OUTPATIENT
Start: 2019-06-27 | End: 2019-07-30

## 2019-06-27 NOTE — PROGRESS NOTES
Hilaria Mukherjee is a 48year old female presents today for follow-up on medical weight loss program for the treatment of overweight, obesity, or morbid obesity with associated HTN.     S:  Current weight Wt Readings from Last 6 Encounters:  06/27/19 : 196 lb htn  Hypothyroidism, unspecified type    No orders of the defined types were placed in this encounter.       Meds & Refills for this Visit:  Requested Prescriptions     Signed Prescriptions Disp Refills   • Phentermine HCl 37.5 MG Oral Tab 30 tablet 0     S the Mental Plateau  First, Set a Non Scale-related Goal  Don't let the scale have total control of your actions. Set a NEW goal that will instill in you a fighting spirit.  Perhaps you could start training for a 5K marathon race, try four new healthy recipe mangement.     Patient verbalizes understanding

## 2019-06-27 NOTE — PATIENT INSTRUCTIONS
Continue making lifestyle changes that focus on good nutrition, regular exercise and stress management. Medication Plan: Restart phentermine at 1/2 tab daily in AM for 7 days, then increase to a full tab daily as prescribed.     Agreed upon goal/s before Add some spark to your day or week by getting social and spending time with your accountability partner(s), spouse, friends or close family. If any of them share similar goals, they can give you encouragement.   Try Something Completely Different  Whatever

## 2019-07-30 ENCOUNTER — OFFICE VISIT (OUTPATIENT)
Dept: INTERNAL MEDICINE CLINIC | Facility: CLINIC | Age: 54
End: 2019-07-30
Payer: COMMERCIAL

## 2019-07-30 VITALS
HEIGHT: 66 IN | SYSTOLIC BLOOD PRESSURE: 110 MMHG | HEART RATE: 70 BPM | WEIGHT: 187 LBS | RESPIRATION RATE: 14 BRPM | BODY MASS INDEX: 30.05 KG/M2 | DIASTOLIC BLOOD PRESSURE: 70 MMHG

## 2019-07-30 DIAGNOSIS — I10 BENIGN ESSENTIAL HTN: ICD-10-CM

## 2019-07-30 DIAGNOSIS — Z51.81 ENCOUNTER FOR THERAPEUTIC DRUG MONITORING: Primary | ICD-10-CM

## 2019-07-30 DIAGNOSIS — E66.9 OBESITY (BMI 30-39.9): ICD-10-CM

## 2019-07-30 PROCEDURE — 99213 OFFICE O/P EST LOW 20 MIN: CPT | Performed by: NURSE PRACTITIONER

## 2019-07-30 RX ORDER — PHENTERMINE HYDROCHLORIDE 37.5 MG/1
37.5 TABLET ORAL EVERY MORNING
Qty: 30 TABLET | Refills: 2 | Status: SHIPPED | OUTPATIENT
Start: 2019-07-30 | End: 2019-10-21

## 2019-07-30 NOTE — PATIENT INSTRUCTIONS
Continue making lifestyle changes that focus on good nutrition, regular exercise and stress management. Medication Plan: Continue current medication regimen.     Agreed upon goal/s before next office visit include: Recommend cutting phentermine in 1/2 to Burn more calories. Unlike fat, muscle beefs up your metabolism to help you burn about 70% more calories than fat can. 2. Improve appearance. When done properly, strength training can greatly improve your posture and help to prevent joint pain.   3. Build

## 2019-07-30 NOTE — PROGRESS NOTES
Princess Sosa is a 48year old female presents today for follow-up on medical weight loss program for the treatment of overweight, obesity, or morbid obesity with associated HTN.     S:  Current weight Wt Readings from Last 6 Encounters:  07/30/19 : 187 lb diagnosis)  Obesity (bmi 30-39. 9)  Benign essential htn    No orders of the defined types were placed in this encounter.       Meds & Refills for this Visit:  Requested Prescriptions     Signed Prescriptions Disp Refills   • Phentermine HCl 37.5 MG Oral Tab build 5 lbs of muscle and lose 5 lbs of fat, you would weigh exactly the same, but look smaller and firmer. So imagine if it were 25 lbs or 50 lbs of lost fat vs muscle gained.   This is why it’s possible for you to lose fat inches when exercising, yet elaina (around 10/30/2019) for weight mangement.     Patient verbalizes understanding

## 2019-10-21 ENCOUNTER — OFFICE VISIT (OUTPATIENT)
Dept: INTERNAL MEDICINE CLINIC | Facility: CLINIC | Age: 54
End: 2019-10-21
Payer: COMMERCIAL

## 2019-10-21 VITALS
HEART RATE: 76 BPM | WEIGHT: 188 LBS | DIASTOLIC BLOOD PRESSURE: 90 MMHG | SYSTOLIC BLOOD PRESSURE: 140 MMHG | HEIGHT: 66 IN | BODY MASS INDEX: 30.22 KG/M2

## 2019-10-21 DIAGNOSIS — E66.9 OBESITY (BMI 30-39.9): ICD-10-CM

## 2019-10-21 DIAGNOSIS — I10 BENIGN ESSENTIAL HTN: ICD-10-CM

## 2019-10-21 DIAGNOSIS — Z51.81 ENCOUNTER FOR THERAPEUTIC DRUG MONITORING: Primary | ICD-10-CM

## 2019-10-21 PROCEDURE — 99214 OFFICE O/P EST MOD 30 MIN: CPT | Performed by: NURSE PRACTITIONER

## 2019-10-21 RX ORDER — PHENTERMINE HYDROCHLORIDE 37.5 MG/1
37.5 TABLET ORAL EVERY MORNING
Qty: 30 TABLET | Refills: 1 | Status: SHIPPED | OUTPATIENT
Start: 2019-10-21 | End: 2020-01-08

## 2019-10-21 RX ORDER — TOPIRAMATE 25 MG/1
25 TABLET ORAL 2 TIMES DAILY
Qty: 60 TABLET | Refills: 1 | Status: SHIPPED | OUTPATIENT
Start: 2019-10-21 | End: 2019-11-21

## 2019-10-21 NOTE — PROGRESS NOTES
Laverne Reeves is a 47year old female presents today for follow-up on medical weight loss program for the treatment of overweight, obesity, or morbid obesity with associated HTN.     S:  Current weight Wt Readings from Last 6 Encounters:  10/21/19 : 188 lb Visit:  Requested Prescriptions     Signed Prescriptions Disp Refills   • Phentermine HCl 37.5 MG Oral Tab 30 tablet 1     Sig: Take 1 tablet (37.5 mg total) by mouth every morning.    • topiramate 25 MG Oral Tab 60 tablet 1     Sig: Take 1 tablet (25 mg to Non Scale-related Goal  Don't let the scale have total control of your actions. Set a NEW goal that will instill in you a fighting spirit.  Perhaps you could start training for a 5K marathon race, try four new healthy recipes next month, or resolve to [facility] understanding

## 2019-10-21 NOTE — PATIENT INSTRUCTIONS
Continue making lifestyle changes that focus on good nutrition, regular exercise and stress management. Medication Plan: Continue current medication regimen, restart Topamax at 1 tab daily for 7 days, then increase to 1 tab twice a day as prescribed. spending time with your accountability partner(s), spouse, friends or close family. If any of them share similar goals, they can give you encouragement. Try Something Completely Different  Whatever you've been doing to take the weight off… do a 180.  Take

## 2019-11-21 ENCOUNTER — OFFICE VISIT (OUTPATIENT)
Dept: INTERNAL MEDICINE CLINIC | Facility: CLINIC | Age: 54
End: 2019-11-21
Payer: COMMERCIAL

## 2019-11-21 VITALS
BODY MASS INDEX: 28.77 KG/M2 | WEIGHT: 179 LBS | SYSTOLIC BLOOD PRESSURE: 122 MMHG | HEART RATE: 80 BPM | HEIGHT: 66 IN | RESPIRATION RATE: 16 BRPM | DIASTOLIC BLOOD PRESSURE: 72 MMHG

## 2019-11-21 DIAGNOSIS — Z51.81 ENCOUNTER FOR THERAPEUTIC DRUG MONITORING: Primary | ICD-10-CM

## 2019-11-21 DIAGNOSIS — E66.9 OBESITY (BMI 30.0-34.9): ICD-10-CM

## 2019-11-21 DIAGNOSIS — I10 BENIGN ESSENTIAL HTN: ICD-10-CM

## 2019-11-21 PROCEDURE — 99213 OFFICE O/P EST LOW 20 MIN: CPT | Performed by: NURSE PRACTITIONER

## 2019-11-21 RX ORDER — TOPIRAMATE 50 MG/1
50 TABLET, FILM COATED ORAL 2 TIMES DAILY
Qty: 60 TABLET | Refills: 2 | Status: SHIPPED | OUTPATIENT
Start: 2019-11-21 | End: 2020-03-17

## 2019-11-21 NOTE — PATIENT INSTRUCTIONS
Continue making lifestyle changes that focus on good nutrition, regular exercise and stress management. Medication Plan: Continue current medication regimen, aside from increase Topamax to 50 mg twice a day.     Agreed upon goal/s before next office visi

## 2020-01-08 DIAGNOSIS — E66.9 OBESITY (BMI 30-39.9): ICD-10-CM

## 2020-01-08 DIAGNOSIS — Z51.81 ENCOUNTER FOR THERAPEUTIC DRUG MONITORING: ICD-10-CM

## 2020-01-08 NOTE — TELEPHONE ENCOUNTER
Faxed request from [address] to refill phentermine    Requesting Phentermine  LOV: 11/21/19  RTC: 2 months  Last Relevant Labs: na  Filled: 10/21/19 #30 with 1 refills    Future Appointments   Date Time Provider Francisco Javier Foster   1/21/2020 11:20 AM Elodia

## 2020-01-11 RX ORDER — PHENTERMINE HYDROCHLORIDE 37.5 MG/1
37.5 TABLET ORAL EVERY MORNING
Qty: 30 TABLET | Refills: 0 | Status: SHIPPED | OUTPATIENT
Start: 2020-01-11 | End: 2020-01-21

## 2020-01-21 ENCOUNTER — OFFICE VISIT (OUTPATIENT)
Dept: INTERNAL MEDICINE CLINIC | Facility: CLINIC | Age: 55
End: 2020-01-21
Payer: COMMERCIAL

## 2020-01-21 ENCOUNTER — TELEPHONE (OUTPATIENT)
Dept: INTERNAL MEDICINE CLINIC | Facility: CLINIC | Age: 55
End: 2020-01-21

## 2020-01-21 VITALS
WEIGHT: 185 LBS | DIASTOLIC BLOOD PRESSURE: 88 MMHG | RESPIRATION RATE: 14 BRPM | BODY MASS INDEX: 29.73 KG/M2 | SYSTOLIC BLOOD PRESSURE: 120 MMHG | HEART RATE: 78 BPM | HEIGHT: 66 IN

## 2020-01-21 DIAGNOSIS — I10 BENIGN ESSENTIAL HTN: ICD-10-CM

## 2020-01-21 DIAGNOSIS — Z51.81 ENCOUNTER FOR THERAPEUTIC DRUG MONITORING: Primary | ICD-10-CM

## 2020-01-21 DIAGNOSIS — E66.9 OBESITY (BMI 30-39.9): ICD-10-CM

## 2020-01-21 PROCEDURE — 99214 OFFICE O/P EST MOD 30 MIN: CPT | Performed by: NURSE PRACTITIONER

## 2020-01-21 RX ORDER — PHENTERMINE HYDROCHLORIDE 37.5 MG/1
37.5 TABLET ORAL EVERY MORNING
Qty: 30 TABLET | Refills: 1 | Status: SHIPPED | OUTPATIENT
Start: 2020-01-21 | End: 2020-04-14

## 2020-01-21 NOTE — PROGRESS NOTES
Fidel Bojorquez is a 47year old female presents today for follow-up on medical weight loss program for the treatment of overweight, obesity, or morbid obesity with associated HTN.     S:  Current weight Wt Readings from Last 6 Encounters:  01/21/20 : 185 lb essential htn    No orders of the defined types were placed in this encounter.       Meds & Refills for this Visit:  Requested Prescriptions     Signed Prescriptions Disp Refills   • Phentermine HCl 37.5 MG Oral Tab 30 tablet 1     Sig: Take 1 tablet (37.5 journey with weight. It will teach you about the primal purpose of food, the effect certain foods have on health, and how to listen carefully to what your body is trying to tell you.   It Starts with Cells  Did you know your body has more than 35 trillion c how that food helps or hurts your body. This is a part of living mindful and being knowledgeable about what you consume regularly.   When you start to see food as fuel, not just a tasty treat or the solution to a bad temper, you will start to make healthier www.mealPerospherellCrossCurrent. com  · Diet Doctor @ www. dietdoctor. com - low carb swaps    Stress Management/Behavior/Mindful Eating  · CALM meditation nicolas  · Headspace  · Am I Hungry? Mindful eating virtual  nicolas  · Www.yourweightmatters. org - Obesity [employer]

## 2020-01-21 NOTE — PATIENT INSTRUCTIONS
Continue making lifestyle changes that focus on good nutrition, regular exercise and stress management.     Medication Plan: Continue current medication regimen, restart phentermine at 1/2 tab daily in AM for 7 days, then increase to a full tab daily as pre membrane  • Nucleus  • Mitochondria  When cells join together, they make tissue that brings life to your bones, brain, skin, nerves, muscles, etc. The health and lifespan of your cells depend on the nutrients you get from food.  That is why healthy food cho targeted at weight loss- [phone] and/or email @ [email or web address]  · AnySize Fitness @ [email or web address].[email or web address]  Personal training and Group Fitness classes with Sherley Rollins [phone]  · 360FIT Livia @ [facility].

## 2020-01-22 NOTE — TELEPHONE ENCOUNTER
Pharmacy called states they just filled rx phentermine a week ago and just received another rx , pharm would like to know if it should be put on hold for pt

## 2020-01-22 NOTE — TELEPHONE ENCOUNTER
I called [place] [facility]. The patient filled her phentermine 1/13/2020 #30 written by our office. She got another Rx at office visit yesterday. I told [facility] to place it on hold until due.

## 2020-03-17 DIAGNOSIS — Z51.81 ENCOUNTER FOR THERAPEUTIC DRUG MONITORING: ICD-10-CM

## 2020-03-17 DIAGNOSIS — E66.9 OBESITY (BMI 30.0-34.9): ICD-10-CM

## 2020-03-17 NOTE — TELEPHONE ENCOUNTER
Name of Medication:  topiramate 50 MG Oral Tab       Dose:     How is medication prescribed:    Specific name of pharmacy and location:  [facility], [address] [place] [address], [address] [place] [address] [phone], [phone]    Name of mail order co

## 2020-03-17 NOTE — TELEPHONE ENCOUNTER
Requesting Topiramate  LOV: 1/21/20  RTC: 2 months  Last Relevant Labs: na  Filled: 11/21/19 #60 with 2 refills    Pt cancelled her appt  For 3/23/20 listed as personal    Future Appointments   Date Time Provider Francisco Javier Foster   3/18/2020  9:30 AM Ryanne

## 2020-03-18 DIAGNOSIS — E66.9 OBESITY (BMI 30.0-34.9): ICD-10-CM

## 2020-03-18 DIAGNOSIS — Z51.81 ENCOUNTER FOR THERAPEUTIC DRUG MONITORING: ICD-10-CM

## 2020-03-18 NOTE — TELEPHONE ENCOUNTER
Requesting Topiramate  LOV: 1/21/20  RTC: 2 months  Last Relevant Labs: na  Filled: 11/21/19 #60 with 2 refills    Future Appointments   Date Time Provider Francisco Javier Foster   4/14/2020 10:20 AM ELLYN Craft EMGKAROL EMG [facility]     Pended for approval

## 2020-03-19 RX ORDER — TOPIRAMATE 50 MG/1
50 TABLET, FILM COATED ORAL 2 TIMES DAILY
Qty: 60 TABLET | Refills: 1 | Status: SHIPPED | OUTPATIENT
Start: 2020-03-19 | End: 2020-04-14

## 2020-03-19 RX ORDER — TOPIRAMATE 50 MG/1
50 TABLET, FILM COATED ORAL 2 TIMES DAILY
Qty: 60 TABLET | Refills: 0 | OUTPATIENT
Start: 2020-03-19

## 2020-04-14 ENCOUNTER — VIRTUAL PHONE E/M (OUTPATIENT)
Dept: INTERNAL MEDICINE CLINIC | Facility: CLINIC | Age: 55
End: 2020-04-14
Payer: COMMERCIAL

## 2020-04-14 DIAGNOSIS — E66.9 OBESITY (BMI 30.0-34.9): ICD-10-CM

## 2020-04-14 DIAGNOSIS — Z51.81 ENCOUNTER FOR THERAPEUTIC DRUG MONITORING: Primary | ICD-10-CM

## 2020-04-14 DIAGNOSIS — I10 BENIGN ESSENTIAL HTN: ICD-10-CM

## 2020-04-14 PROCEDURE — 99213 OFFICE O/P EST LOW 20 MIN: CPT | Performed by: NURSE PRACTITIONER

## 2020-04-14 RX ORDER — TOPIRAMATE 100 MG/1
100 TABLET, FILM COATED ORAL 2 TIMES DAILY
Qty: 180 TABLET | Refills: 0 | Status: SHIPPED | OUTPATIENT
Start: 2020-04-14 | End: 2020-07-08

## 2020-04-14 RX ORDER — PHENTERMINE HYDROCHLORIDE 37.5 MG/1
37.5 TABLET ORAL EVERY MORNING
Qty: 30 TABLET | Refills: 1 | Status: SHIPPED | OUTPATIENT
Start: 2020-04-14 | End: 2020-07-08

## 2020-04-14 NOTE — PATIENT INSTRUCTIONS
Thank you for taking the time for our virtual enounter today! Here are the next steps and plans we discussed:    1. Continue your medications, aside from increase Topamax to 100 mg twice a day- new script at [facility].   2. Begin developing a routine exercise  • Intensity – How hard you exercise  • Time – How long you exercise  • Type – What kind of exercise you do  How Often Should You Exercise? It is a myth that you must work out for extended periods every day to lose weight and keep it off.  What is “sets” and “reps.” Regardless, many other factors are involved. • The amount of time you have  • How long it takes you to feel fatigued  • Weather, time of day  • Health conditions  What Should You Do for Exercise?   Should you hit up the elliptical at the

## 2020-04-14 NOTE — PROGRESS NOTES
Virtual Telephone Check-In    Shawna Blizzard verbally consents to a Virtual/Telephone Check-In visit on 4/14/2020. Patient understands and accepts financial responsibility for any deductible, co-insurance and/or co-pays associated with this service.     D mouth 2 (two) times daily.     Obesity (BMI 30.0-34.9)  - CPM aside from increase Topamax dose for cravings/snacking  -  on home fitness options and establishing a routine  - stress management and behavior tips/tools provided  -     Phentermine HCl 3

## 2020-07-08 ENCOUNTER — OFFICE VISIT (OUTPATIENT)
Dept: INTERNAL MEDICINE CLINIC | Facility: CLINIC | Age: 55
End: 2020-07-08
Payer: COMMERCIAL

## 2020-07-08 VITALS
RESPIRATION RATE: 16 BRPM | HEIGHT: 66 IN | WEIGHT: 180 LBS | DIASTOLIC BLOOD PRESSURE: 80 MMHG | HEART RATE: 78 BPM | BODY MASS INDEX: 28.93 KG/M2 | SYSTOLIC BLOOD PRESSURE: 120 MMHG | TEMPERATURE: 98 F

## 2020-07-08 DIAGNOSIS — E66.9 OBESITY (BMI 30.0-34.9): ICD-10-CM

## 2020-07-08 DIAGNOSIS — I10 BENIGN ESSENTIAL HTN: ICD-10-CM

## 2020-07-08 DIAGNOSIS — Z51.81 ENCOUNTER FOR THERAPEUTIC DRUG MONITORING: Primary | ICD-10-CM

## 2020-07-08 PROCEDURE — 99213 OFFICE O/P EST LOW 20 MIN: CPT | Performed by: NURSE PRACTITIONER

## 2020-07-08 RX ORDER — PHENTERMINE HYDROCHLORIDE 37.5 MG/1
37.5 TABLET ORAL EVERY MORNING
Qty: 30 TABLET | Refills: 2 | Status: SHIPPED | OUTPATIENT
Start: 2020-07-08 | End: 2021-06-07

## 2020-07-08 RX ORDER — TOPIRAMATE 100 MG/1
100 TABLET, FILM COATED ORAL 2 TIMES DAILY
Qty: 180 TABLET | Refills: 1 | Status: SHIPPED | OUTPATIENT
Start: 2020-07-08 | End: 2021-06-07

## 2020-07-08 NOTE — PATIENT INSTRUCTIONS
Continue making lifestyle changes that focus on good nutrition, regular exercise and stress management. Medication Plan: Continue current medication regimen.     Agreed upon goal/s before next office visit include: Establish a daily fitness routine with years of age and currently weighs 145 lbs, while the \"average\" man is 52years of age and currently weighs 190 lbs. • Registry members have lost an average of 66 lbs and kept it off for 5.5 years.   • These averages, however, hide a lot of diversity:  o

## 2020-07-08 NOTE — PROGRESS NOTES
Hilaria Mukhereje is a 47year old female presents today for follow-up on medical weight loss program for the treatment of overweight, obesity, or morbid obesity with associated HTN.     S:  Current weight Wt Readings from Last 6 Encounters:  07/08/20 : 180 lb placed in this encounter. Meds & Refills for this Visit:  Requested Prescriptions     Signed Prescriptions Disp Refills   • topiramate 100 MG Oral Tab 180 tablet 1     Sig: Take 1 tablet (100 mg total) by mouth 2 (two) times daily.    • Phentermine HCl like. Go slowly, especially when just starting out. Work up to being active 30 minutes on most days. Aim for a total of 150 or more minutes a week. Why be fit? People who are physically fit:  · Are more alert and productive.   · Have more energy, both phy doing high levels of activity. o 78% eat breakfast every day. o 75% weigh themselves at least once a week. o 62% watch less than 10 hours of TV per week. o 90% exercise, on average, about 1 hour per day. [facility] Control Registry @ www. Jo Ann Bruner

## 2020-07-30 NOTE — PROGRESS NOTES
Discharge instructions and prescriptions (Ibuprofen, Flomax, and Keflex) reviewed with mother. She acknowledged understanding. Patient teaching on [address] [place] [address] [facility] performed. Patient aware of the directions of how to titrate the dose on this medication.    Start therapy: Week 1- .6mg daily   Week 2- 1.2mg daily   Week 3- 1.8mg daily   Week 4- 2.4mg daily   Week 5- 3mg daily   Patient demons

## 2021-06-05 NOTE — PROGRESS NOTES
Enio Smith is a 54year old female presents today for follow-up on medical weight loss program for the treatment of overweight, obesity, or morbid obesity with associated HTN.     S:  Current weight Wt Readings from Last 6 Encounters:  06/07/21 : 208 lb monitoring  (primary encounter diagnosis)  Obesity (bmi 30.0-34. 9)  Benign essential htn  Weight gain    No orders of the defined types were placed in this encounter.       Meds & Refills for this Visit:  Requested Prescriptions     Signed Prescriptions Dis Masterclass lesson on The 4 Pillars of Health found on the CALM nicolas under more. 6 Important Wellness Tips to Adopt for a Healthy 2021 January 1, 2021 • Posted in Nirali Nuñez • By Your [place] Matters Campaign    A new year is upon us!  Now is a time total health. 4 – Cut Screen Time  A digital detox is good for mental and emotional health as well as your physical health. Replace more screen time this year with activities that will improve your quality of life.   5 – Drop the Need for Perfectionism  Pe in place of regular pasta. 5.  Practice mindful eating – Are you experiencing true hunger or is it possibly emotional or environmental? When you practice mindful eating, you can identify what true hunger feels like.  Knowing your body's signals can help reviewed with patient. Return in about 1 month (around 7/7/2021) for weight mangement.     Patient verbalizes understanding

## 2021-06-07 ENCOUNTER — OFFICE VISIT (OUTPATIENT)
Dept: INTERNAL MEDICINE CLINIC | Facility: CLINIC | Age: 56
End: 2021-06-07
Payer: COMMERCIAL

## 2021-06-07 VITALS
HEIGHT: 66 IN | WEIGHT: 208 LBS | BODY MASS INDEX: 33.43 KG/M2 | DIASTOLIC BLOOD PRESSURE: 70 MMHG | SYSTOLIC BLOOD PRESSURE: 134 MMHG | HEART RATE: 72 BPM | RESPIRATION RATE: 14 BRPM

## 2021-06-07 DIAGNOSIS — R63.5 WEIGHT GAIN: ICD-10-CM

## 2021-06-07 DIAGNOSIS — I10 BENIGN ESSENTIAL HTN: ICD-10-CM

## 2021-06-07 DIAGNOSIS — E66.9 OBESITY (BMI 30.0-34.9): ICD-10-CM

## 2021-06-07 DIAGNOSIS — Z51.81 ENCOUNTER FOR THERAPEUTIC DRUG MONITORING: Primary | ICD-10-CM

## 2021-06-07 PROCEDURE — 3008F BODY MASS INDEX DOCD: CPT | Performed by: NURSE PRACTITIONER

## 2021-06-07 PROCEDURE — 3075F SYST BP GE 130 - 139MM HG: CPT | Performed by: NURSE PRACTITIONER

## 2021-06-07 PROCEDURE — 3078F DIAST BP <80 MM HG: CPT | Performed by: NURSE PRACTITIONER

## 2021-06-07 PROCEDURE — 99214 OFFICE O/P EST MOD 30 MIN: CPT | Performed by: NURSE PRACTITIONER

## 2021-06-07 RX ORDER — PHENTERMINE HYDROCHLORIDE 37.5 MG/1
37.5 TABLET ORAL EVERY MORNING
Qty: 30 TABLET | Refills: 1 | Status: SHIPPED | OUTPATIENT
Start: 2021-06-07 | End: 2021-08-11

## 2021-06-07 RX ORDER — TOPIRAMATE 25 MG/1
25 TABLET ORAL 2 TIMES DAILY
Qty: 60 TABLET | Refills: 1 | Status: SHIPPED | OUTPATIENT
Start: 2021-06-07 | End: 2021-08-11

## 2021-06-07 NOTE — PATIENT INSTRUCTIONS
Continue making lifestyle changes that focus on good nutrition, regular exercise and stress management. Medication Plan: Restart phentermine at 1/2 tab daily in AM for 7 days, then increase to a full tab daily as prescribed.  Restart Topamax at 1 tab nitin functional benefits, including stress management and better sleep. This year, set measurable and attainable goals for getting more exercise that focus on overall health and well-being, not a number on the scale.   The Centers for Disease Control recommends 2. Eat at home – You maintain greater control over the food you eat and how it is prepared by eating at home. This will allow you to reduce the amount of salt, sugar, and fat consumed, which can lead to a reduction in calories.      3. Meal plan – As th Many times people feel hungry when they are actually thirsty. In order to stay hydrated, always keep a water bottle with you. This will serve as your reminder to get the recommended minimum of 64 ounces of water per day.      9. Eliminate late night eating

## 2021-08-10 DIAGNOSIS — Z51.81 ENCOUNTER FOR THERAPEUTIC DRUG MONITORING: ICD-10-CM

## 2021-08-10 DIAGNOSIS — E66.9 OBESITY (BMI 30.0-34.9): ICD-10-CM

## 2021-08-10 DIAGNOSIS — R63.5 WEIGHT GAIN: ICD-10-CM

## 2021-08-10 NOTE — TELEPHONE ENCOUNTER
Requesting Phentermine and Topiramate  LOV: 6/7/21  RTC: one month  Last Relevant Labs: na  Filled: 6/7/21 #one month with 1 refills (both meds)  [place]21 [place]    Future Appointments   Date Time Provider Francisco Javier Foster   9/1/2021  1:20 PM ELLYN Llamas EMGWEI EMG [facility]

## 2021-08-11 RX ORDER — TOPIRAMATE 25 MG/1
25 TABLET ORAL 2 TIMES DAILY
Qty: 60 TABLET | Refills: 1 | Status: SHIPPED | OUTPATIENT
Start: 2021-08-11 | End: 2021-09-02

## 2021-08-11 RX ORDER — PHENTERMINE HYDROCHLORIDE 37.5 MG/1
37.5 TABLET ORAL EVERY MORNING
Qty: 30 TABLET | Refills: 0 | Status: SHIPPED | OUTPATIENT
Start: 2021-08-11 | End: 2021-09-02

## 2021-09-01 NOTE — PROGRESS NOTES
Burgess Elias is a 54year old female presents today for follow-up on medical weight loss program for the treatment of overweight, obesity, or morbid obesity with associated HTN.     S:  Current weight Wt Readings from Last 6 Encounters:  09/02/21 : 192 lb for this Visit:  Requested Prescriptions     Signed Prescriptions Disp Refills   • Phentermine HCl 37.5 MG Oral Tab 30 tablet 1     Sig: Take 1 tablet (37.5 mg total) by mouth every morning.    • topiramate 50 MG Oral Tab 180 tablet 0     Sig: Take 1 tablet look slim and keep the inches off. Well, look no further! With the fat vs muscle diagram below you’ll see why healthy permanent weight loss requires you to build muscle to lose fat. Fat vs Muscle Diagram  The facts are clear.  The best way to lose fat and activities improve your bone density and reduce bone loss. This helps to prevent osteoporosis. Studies show that weight training combined with aerobics and stretching is the best way to build a strong, firm body and keep it that way.   So, if you want to l

## 2021-09-02 ENCOUNTER — OFFICE VISIT (OUTPATIENT)
Dept: INTERNAL MEDICINE CLINIC | Facility: CLINIC | Age: 56
End: 2021-09-02
Payer: COMMERCIAL

## 2021-09-02 VITALS
WEIGHT: 192 LBS | SYSTOLIC BLOOD PRESSURE: 120 MMHG | DIASTOLIC BLOOD PRESSURE: 80 MMHG | RESPIRATION RATE: 16 BRPM | BODY MASS INDEX: 30.86 KG/M2 | HEIGHT: 66 IN | HEART RATE: 80 BPM

## 2021-09-02 DIAGNOSIS — E66.9 OBESITY (BMI 30.0-34.9): ICD-10-CM

## 2021-09-02 DIAGNOSIS — Z51.81 ENCOUNTER FOR THERAPEUTIC DRUG MONITORING: Primary | ICD-10-CM

## 2021-09-02 PROCEDURE — 99213 OFFICE O/P EST LOW 20 MIN: CPT | Performed by: NURSE PRACTITIONER

## 2021-09-02 PROCEDURE — 3079F DIAST BP 80-89 MM HG: CPT | Performed by: NURSE PRACTITIONER

## 2021-09-02 PROCEDURE — 3074F SYST BP LT 130 MM HG: CPT | Performed by: NURSE PRACTITIONER

## 2021-09-02 PROCEDURE — 3008F BODY MASS INDEX DOCD: CPT | Performed by: NURSE PRACTITIONER

## 2021-09-02 RX ORDER — TOPIRAMATE 50 MG/1
50 TABLET, FILM COATED ORAL 2 TIMES DAILY
Qty: 180 TABLET | Refills: 0 | Status: SHIPPED | OUTPATIENT
Start: 2021-09-02 | End: 2021-12-07

## 2021-09-02 RX ORDER — PHENTERMINE HYDROCHLORIDE 37.5 MG/1
37.5 TABLET ORAL EVERY MORNING
Qty: 30 TABLET | Refills: 1 | Status: SHIPPED | OUTPATIENT
Start: 2021-09-02 | End: 2021-10-25

## 2021-09-02 NOTE — PATIENT INSTRUCTIONS
Continue making lifestyle changes that focus on good nutrition, regular exercise and stress management. Medication Plan: Continue current medication regimen aside from increase Topamax to 50 mg twice a day.     Agreed upon goal/s before next office visit good heart health and weight management, combining muscle building weight training with cardiovascular exercise, gives you an unbeatable combination to lose fat and keep it off permanently.   Of course it takes a few weeks before you see any measurable lama

## 2021-10-25 ENCOUNTER — OFFICE VISIT (OUTPATIENT)
Dept: INTERNAL MEDICINE CLINIC | Facility: CLINIC | Age: 56
End: 2021-10-25
Payer: COMMERCIAL

## 2021-10-25 VITALS
DIASTOLIC BLOOD PRESSURE: 80 MMHG | BODY MASS INDEX: 31.02 KG/M2 | WEIGHT: 193 LBS | HEART RATE: 78 BPM | SYSTOLIC BLOOD PRESSURE: 140 MMHG | RESPIRATION RATE: 14 BRPM | HEIGHT: 66 IN

## 2021-10-25 DIAGNOSIS — R73.01 IFG (IMPAIRED FASTING GLUCOSE): ICD-10-CM

## 2021-10-25 DIAGNOSIS — I10 BENIGN ESSENTIAL HTN: ICD-10-CM

## 2021-10-25 DIAGNOSIS — Z51.81 ENCOUNTER FOR THERAPEUTIC DRUG MONITORING: Primary | ICD-10-CM

## 2021-10-25 DIAGNOSIS — E66.9 OBESITY (BMI 30.0-34.9): ICD-10-CM

## 2021-10-25 PROCEDURE — 3079F DIAST BP 80-89 MM HG: CPT | Performed by: NURSE PRACTITIONER

## 2021-10-25 PROCEDURE — 3077F SYST BP >= 140 MM HG: CPT | Performed by: NURSE PRACTITIONER

## 2021-10-25 PROCEDURE — 99213 OFFICE O/P EST LOW 20 MIN: CPT | Performed by: NURSE PRACTITIONER

## 2021-10-25 PROCEDURE — 3008F BODY MASS INDEX DOCD: CPT | Performed by: NURSE PRACTITIONER

## 2021-10-25 RX ORDER — METFORMIN HYDROCHLORIDE 750 MG/1
1500 TABLET, EXTENDED RELEASE ORAL
Qty: 60 TABLET | Refills: 2 | Status: SHIPPED | OUTPATIENT
Start: 2021-10-25

## 2021-10-25 RX ORDER — PHENTERMINE HYDROCHLORIDE 37.5 MG/1
37.5 TABLET ORAL EVERY MORNING
Qty: 30 TABLET | Refills: 1 | Status: SHIPPED | OUTPATIENT
Start: 2021-10-25 | End: 2021-12-07

## 2021-10-25 NOTE — PATIENT INSTRUCTIONS
Continue making lifestyle changes that focus on good nutrition, regular exercise and stress management. Medication Plan: Continue current medication regimen aside from add Metformin ER.  Start Metformin ER 1 tab daily with meal for 7 days, then increase waited until evening. If you frequently check the scale, eliminate this variable by weighing yourself at the same time of day, preferably in the morning before you’ve eaten.     High-Sodium and High-Carb Foods  Foods that are high in sodium (salt) and Jocelyn weight in check long-term. If you see the scale fluctuating often within the same 2-5 lbs. , try to relax a little bit. There are a lot of factors to take into consideration that are beyond your control.  This is why many health experts suggest you only weig

## 2021-10-25 NOTE — PROGRESS NOTES
Shawna Blizzard is a 64year old female presents today for follow-up on medical weight loss program for the treatment of overweight, obesity, or morbid obesity with associated HTN.     S:  Current weight Wt Readings from Last 6 Encounters:  10/25/21 : 193 lb Encounter      Hemoglobin A1C      Meds & Refills for this Visit:  Requested Prescriptions     Signed Prescriptions Disp Refills   • metFORMIN HCl  MG Oral Tablet 24 Hr 60 tablet 2     Sig: Take 2 tablets (1,500 mg total) by mouth daily with food.  St fluctuation on the scale almost every day can be aggravating, especially if you’re actively trying to lose weight for your health.  If you weigh yourself frequently throughout the week (some people do it daily or every other day), you’ve no doubt seen the s water you’ve lost while sweating during exercise. Changes in Hormones  Changes in your hormones can also make your body retain more water and gain a little bit of water weight.  This is also why many women complain about stomach bloat before or during th

## 2021-11-02 ENCOUNTER — TELEPHONE (OUTPATIENT)
Dept: INTERNAL MEDICINE CLINIC | Facility: CLINIC | Age: 56
End: 2021-11-02

## 2021-12-06 NOTE — PROGRESS NOTES
[facility] Weight Management follow up via video visit:    Subjective    This visit is conducted using Telemedicine with live, interactive video and audio.     Chief Complaint:  Routine follow up visit for lifestyle and medical management for overweigh tablet (50 mg total) by mouth 2 (two) times daily.  -     Phentermine HCl 37.5 MG Oral Tab; Take 1 tablet (37.5 mg total) by mouth every morning. Obesity (BMI 30.0-34.9)  - CPM  -  on holiday tips  -     topiramate 50 MG Oral Tab;  Take 1 tablet ( this. The emotional chaos can take all the santos out of your holiday season. You shouldn’t have to feel deprived, but you also shouldn’t have to feel ashamed about something like food. You CAN feel in-control over the holidays.  You CAN feel confident in y prized dish  Tease you about your “new” attitude toward food  To plan for moments like this, give a heads-up. Let them know beforehand that your health goals matter to you. Can they support you in this?  If you put a positive spin on how eating well has hel Use Polytrim eye drops  Apply 1 drop in affected eye every 3 hours for 10 days  Apply erythromycin ointment ~1 cm ribbon into affected eye up to 6 times daily      Follow up with Opthomology this week      Worsening, continued or new concerning symptoms, fever, increased redness or swelling, return to the emergency department.

## 2021-12-07 ENCOUNTER — TELEMEDICINE (OUTPATIENT)
Dept: INTERNAL MEDICINE CLINIC | Facility: CLINIC | Age: 56
End: 2021-12-07

## 2021-12-07 DIAGNOSIS — Z51.81 ENCOUNTER FOR THERAPEUTIC DRUG MONITORING: Primary | ICD-10-CM

## 2021-12-07 DIAGNOSIS — I10 BENIGN ESSENTIAL HTN: ICD-10-CM

## 2021-12-07 DIAGNOSIS — E66.9 OBESITY (BMI 30.0-34.9): ICD-10-CM

## 2021-12-07 DIAGNOSIS — R73.01 IFG (IMPAIRED FASTING GLUCOSE): ICD-10-CM

## 2021-12-07 PROCEDURE — 99213 OFFICE O/P EST LOW 20 MIN: CPT | Performed by: NURSE PRACTITIONER

## 2021-12-07 RX ORDER — TOPIRAMATE 50 MG/1
50 TABLET, FILM COATED ORAL 2 TIMES DAILY
Qty: 180 TABLET | Refills: 1 | Status: SHIPPED | OUTPATIENT
Start: 2021-12-07

## 2021-12-07 RX ORDER — PHENTERMINE HYDROCHLORIDE 37.5 MG/1
37.5 TABLET ORAL EVERY MORNING
Qty: 30 TABLET | Refills: 1 | Status: SHIPPED | OUTPATIENT
Start: 2021-12-07

## 2021-12-07 NOTE — PATIENT INSTRUCTIONS
Continue making lifestyle changes that focus on good nutrition, regular exercise and stress management. Medication Plan: Continue current medication regimen.     Agreed upon goal/s before next office visit include: Rest and recover and then return to fit currently trying to lose weight, you might prefer putting this goal on hold for a short period. In this case, reset your goal and strive to maintain your weight instead.  No matter what you try, make sure your primary focus is on how you feel vs. what the s take it personally, but you don’t have to. #3 Work on Your Mindset  Think about your relationship with food during the holidays. Why do we focus so much on it? Happiness comes from health and confidence. It comes from being around people we love.  It arellano

## 2022-02-26 DIAGNOSIS — Z51.81 ENCOUNTER FOR THERAPEUTIC DRUG MONITORING: ICD-10-CM

## 2022-02-26 DIAGNOSIS — R73.01 IFG (IMPAIRED FASTING GLUCOSE): ICD-10-CM

## 2022-02-26 DIAGNOSIS — E66.9 OBESITY (BMI 30.0-34.9): ICD-10-CM

## 2022-02-26 RX ORDER — METFORMIN HYDROCHLORIDE 750 MG/1
1500 TABLET, EXTENDED RELEASE ORAL
Qty: 60 TABLET | Refills: 2 | Status: CANCELLED | OUTPATIENT
Start: 2022-02-26

## 2022-02-26 RX ORDER — TOPIRAMATE 50 MG/1
50 TABLET, FILM COATED ORAL 2 TIMES DAILY
Qty: 180 TABLET | Refills: 1 | Status: CANCELLED | OUTPATIENT
Start: 2022-02-26

## 2022-02-26 RX ORDER — PHENTERMINE HYDROCHLORIDE 37.5 MG/1
37.5 TABLET ORAL EVERY MORNING
Qty: 30 TABLET | Refills: 1 | Status: CANCELLED | OUTPATIENT
Start: 2022-02-26

## 2022-02-27 NOTE — TELEPHONE ENCOUNTER
Requesting metformin, topiramate, phentermine  LOV: 12/7/21  RTC: 6 weeks  Last Relevant Labs: no A1c  Filled: 12/7/21 #180 with 1 refills topiramate  Filled: 12/7/21 #30 with 1 refills  Phentermine  Filled: 10/25/21 #60 with 2 refills  metformin    No future appointments. Pt has not ever scheduled her f/u.   My chart sent to her

## 2022-03-11 ENCOUNTER — TELEPHONE (OUTPATIENT)
Dept: INTERNAL MEDICINE CLINIC | Facility: CLINIC | Age: 57
End: 2022-03-11

## 2022-03-11 NOTE — TELEPHONE ENCOUNTER
Name of Medication: Phentermine     Dose:     How is medication prescribed:    Specific name of pharmacy and location: [address] [place] [address]     Name of mail order company:

## 2022-03-13 NOTE — TELEPHONE ENCOUNTER
Requesting Phentermine  LOV: 12/7/21  RTC: 6 weeks  Last Relevant Labs: na  Filled: 3/11/22 #30 with 0 refills    Future Appointments   Date Time Provider Francisco Javier Foster   3/21/2022  9:40 AM ELLYN BarrosWERIK [id number]

## 2022-03-21 ENCOUNTER — OFFICE VISIT (OUTPATIENT)
Dept: INTERNAL MEDICINE CLINIC | Facility: CLINIC | Age: 57
End: 2022-03-21
Payer: COMMERCIAL

## 2022-03-21 VITALS
SYSTOLIC BLOOD PRESSURE: 140 MMHG | HEIGHT: 66 IN | HEART RATE: 80 BPM | RESPIRATION RATE: 16 BRPM | BODY MASS INDEX: 31.34 KG/M2 | WEIGHT: 195 LBS | DIASTOLIC BLOOD PRESSURE: 86 MMHG

## 2022-03-21 DIAGNOSIS — E66.9 OBESITY (BMI 30-39.9): ICD-10-CM

## 2022-03-21 DIAGNOSIS — Z51.81 ENCOUNTER FOR THERAPEUTIC DRUG MONITORING: Primary | ICD-10-CM

## 2022-03-21 PROCEDURE — 3077F SYST BP >= 140 MM HG: CPT | Performed by: NURSE PRACTITIONER

## 2022-03-21 PROCEDURE — 3079F DIAST BP 80-89 MM HG: CPT | Performed by: NURSE PRACTITIONER

## 2022-03-21 PROCEDURE — 99213 OFFICE O/P EST LOW 20 MIN: CPT | Performed by: NURSE PRACTITIONER

## 2022-03-21 PROCEDURE — 3008F BODY MASS INDEX DOCD: CPT | Performed by: NURSE PRACTITIONER

## 2022-03-21 RX ORDER — PHENTERMINE HYDROCHLORIDE 37.5 MG/1
37.5 TABLET ORAL EVERY MORNING
Qty: 30 TABLET | Refills: 2 | Status: SHIPPED | OUTPATIENT
Start: 2022-03-21

## 2022-03-21 RX ORDER — METFORMIN HYDROCHLORIDE 750 MG/1
750 TABLET, EXTENDED RELEASE ORAL 2 TIMES DAILY WITH MEALS
Qty: 180 TABLET | Refills: 1 | Status: SHIPPED | OUTPATIENT
Start: 2022-03-21

## 2022-03-23 NOTE — PROGRESS NOTES
Viridiana Mcneal is a 47year old female presents today for follow-up on medical weight loss program for the treatment of overweight, obesity, or morbid obesity with associated HTN.     S:  Current weight Wt Readings from Last 6 Encounters:  11/21/19 : 179 lb Visit:  Requested Prescriptions     Signed Prescriptions Disp Refills   • topiramate 50 MG Oral Tab 60 tablet 2     Sig: Take 1 tablet (50 mg total) by mouth 2 (two) times daily.        Imaging & Consults:  None      Plan:  Patient has lost 9# since LOV 1 m Quality 402: Tobacco Use And Help With Quitting Among Adolescents: Patient screened for tobacco and is an ex-smoker Detail Level: Detailed Quality 431: Preventive Care And Screening: Unhealthy Alcohol Use - Screening: Patient not identified as an unhealthy alcohol user when screened for unhealthy alcohol use using a systematic screening method

## 2022-06-09 ENCOUNTER — TELEPHONE (OUTPATIENT)
Dept: INTERNAL MEDICINE CLINIC | Facility: CLINIC | Age: 57
End: 2022-06-09

## 2022-06-09 NOTE — TELEPHONE ENCOUNTER
Patient left message she is trying to obtain a refill. She should have a refill at pharmacy. I called her and she thought the phentermine was at [facility].  I explained it was sent to THE [facility] 3/21/22 for 3 months. She is going to call them and make sure she has it.   She has been calling [facility].   Requesting Phentermine  LOV: 3/21/22   RTC: 3 months  Last Relevant Labs: na  Filled: 3/21/22 #30 with 2 refills    Future Appointments   Date Time Provider Francisco Javier Foster   6/20/2022 10:00 AM ELLYN Marshall EMGWEI [id number]

## 2022-06-20 ENCOUNTER — OFFICE VISIT (OUTPATIENT)
Dept: INTERNAL MEDICINE CLINIC | Facility: CLINIC | Age: 57
End: 2022-06-20
Payer: COMMERCIAL

## 2022-06-20 VITALS
HEIGHT: 66 IN | SYSTOLIC BLOOD PRESSURE: 118 MMHG | HEART RATE: 70 BPM | RESPIRATION RATE: 16 BRPM | BODY MASS INDEX: 29.89 KG/M2 | DIASTOLIC BLOOD PRESSURE: 84 MMHG | WEIGHT: 186 LBS

## 2022-06-20 DIAGNOSIS — Z51.81 ENCOUNTER FOR THERAPEUTIC DRUG MONITORING: Primary | ICD-10-CM

## 2022-06-20 DIAGNOSIS — I10 BENIGN ESSENTIAL HTN: ICD-10-CM

## 2022-06-20 DIAGNOSIS — R73.01 IFG (IMPAIRED FASTING GLUCOSE): ICD-10-CM

## 2022-06-20 DIAGNOSIS — E66.9 OBESITY (BMI 30-39.9): ICD-10-CM

## 2022-06-20 PROCEDURE — 3074F SYST BP LT 130 MM HG: CPT | Performed by: NURSE PRACTITIONER

## 2022-06-20 PROCEDURE — 99213 OFFICE O/P EST LOW 20 MIN: CPT | Performed by: NURSE PRACTITIONER

## 2022-06-20 PROCEDURE — 3079F DIAST BP 80-89 MM HG: CPT | Performed by: NURSE PRACTITIONER

## 2022-06-20 PROCEDURE — 3008F BODY MASS INDEX DOCD: CPT | Performed by: NURSE PRACTITIONER

## 2022-06-20 RX ORDER — PHENTERMINE HYDROCHLORIDE 37.5 MG/1
37.5 TABLET ORAL EVERY MORNING
Qty: 30 TABLET | Refills: 2 | Status: SHIPPED | OUTPATIENT
Start: 2022-06-20

## 2022-07-18 ENCOUNTER — TELEPHONE (OUTPATIENT)
Dept: INTERNAL MEDICINE CLINIC | Facility: CLINIC | Age: 57
End: 2022-07-18

## 2022-07-18 DIAGNOSIS — E66.9 OBESITY (BMI 30-39.9): ICD-10-CM

## 2022-07-18 DIAGNOSIS — Z51.81 ENCOUNTER FOR THERAPEUTIC DRUG MONITORING: ICD-10-CM

## 2022-07-18 NOTE — TELEPHONE ENCOUNTER
Pt left a vm stating a refill on   Phentermine HCl 37.5 MG Oral Tab    Pt wants the rx sent to [facility]. Pt states current pharmacy can not fill it.

## 2022-07-18 NOTE — TELEPHONE ENCOUNTER
ORIGINAL order sent to [facility]  Last filled 6/9/22 [place]    Cannot get at her current pharmacy. Will you sign for Dalton?

## 2022-07-19 RX ORDER — PHENTERMINE HYDROCHLORIDE 37.5 MG/1
37.5 TABLET ORAL EVERY MORNING
Qty: 30 TABLET | Refills: 1 | Status: SHIPPED | OUTPATIENT
Start: 2022-07-19

## 2022-08-18 DIAGNOSIS — E66.9 OBESITY (BMI 30.0-34.9): ICD-10-CM

## 2022-08-19 RX ORDER — TOPIRAMATE 50 MG/1
50 TABLET, FILM COATED ORAL 2 TIMES DAILY
Qty: 180 TABLET | Refills: 1 | OUTPATIENT
Start: 2022-08-19

## 2022-08-22 DIAGNOSIS — E66.9 OBESITY (BMI 30.0-34.9): ICD-10-CM

## 2022-08-23 RX ORDER — TOPIRAMATE 50 MG/1
TABLET, FILM COATED ORAL
Qty: 180 TABLET | Refills: 1 | OUTPATIENT
Start: 2022-08-23

## 2022-09-02 DIAGNOSIS — E66.9 OBESITY (BMI 30-39.9): ICD-10-CM

## 2022-09-02 DIAGNOSIS — Z51.81 ENCOUNTER FOR THERAPEUTIC DRUG MONITORING: ICD-10-CM

## 2022-09-02 RX ORDER — METFORMIN HYDROCHLORIDE 750 MG/1
TABLET, EXTENDED RELEASE ORAL
Qty: 180 TABLET | Refills: 1 | OUTPATIENT
Start: 2022-09-02

## 2022-09-02 NOTE — TELEPHONE ENCOUNTER
Requesting metformin  mg  LOV: 6/20/22  RTC: 3 months  Last Relevant Labs: no A1c  Filled: 3/21/22 #180 with 1 refills    Future Appointments   Date Time Provider Francisco Javier Foster   9/19/2022 10:00 AM ELLYN CraftWERIK [id number]     Denied this request from [facility] as it was sent to THE [facility] and not due until the end of September

## 2022-09-19 ENCOUNTER — OFFICE VISIT (OUTPATIENT)
Dept: INTERNAL MEDICINE CLINIC | Facility: CLINIC | Age: 57
End: 2022-09-19
Payer: COMMERCIAL

## 2022-09-19 VITALS
WEIGHT: 183 LBS | HEART RATE: 74 BPM | BODY MASS INDEX: 29.41 KG/M2 | OXYGEN SATURATION: 100 % | HEIGHT: 66 IN | SYSTOLIC BLOOD PRESSURE: 122 MMHG | DIASTOLIC BLOOD PRESSURE: 86 MMHG | RESPIRATION RATE: 16 BRPM

## 2022-09-19 DIAGNOSIS — I10 BENIGN ESSENTIAL HTN: ICD-10-CM

## 2022-09-19 DIAGNOSIS — E66.9 OBESITY (BMI 30-39.9): ICD-10-CM

## 2022-09-19 DIAGNOSIS — Z51.81 ENCOUNTER FOR THERAPEUTIC DRUG MONITORING: Primary | ICD-10-CM

## 2022-09-19 PROCEDURE — 3074F SYST BP LT 130 MM HG: CPT | Performed by: NURSE PRACTITIONER

## 2022-09-19 PROCEDURE — 3079F DIAST BP 80-89 MM HG: CPT | Performed by: NURSE PRACTITIONER

## 2022-09-19 PROCEDURE — 99213 OFFICE O/P EST LOW 20 MIN: CPT | Performed by: NURSE PRACTITIONER

## 2022-09-19 PROCEDURE — 3008F BODY MASS INDEX DOCD: CPT | Performed by: NURSE PRACTITIONER

## 2022-09-19 RX ORDER — TOPIRAMATE 50 MG/1
50 TABLET, FILM COATED ORAL 2 TIMES DAILY
Qty: 180 TABLET | Refills: 1 | Status: SHIPPED | OUTPATIENT
Start: 2022-09-19

## 2022-09-19 RX ORDER — PHENTERMINE HYDROCHLORIDE 37.5 MG/1
37.5 TABLET ORAL EVERY MORNING
Qty: 30 TABLET | Refills: 2 | Status: SHIPPED | OUTPATIENT
Start: 2022-09-19

## 2022-09-19 RX ORDER — METFORMIN HYDROCHLORIDE 750 MG/1
750 TABLET, EXTENDED RELEASE ORAL 2 TIMES DAILY WITH MEALS
Qty: 180 TABLET | Refills: 1 | Status: SHIPPED | OUTPATIENT
Start: 2022-09-19

## 2022-11-08 ENCOUNTER — TELEPHONE (OUTPATIENT)
Dept: INTERNAL MEDICINE CLINIC | Facility: CLINIC | Age: 57
End: 2022-11-08

## 2022-11-08 DIAGNOSIS — E78.00 HYPERCHOLESTEREMIA: ICD-10-CM

## 2022-11-08 DIAGNOSIS — I10 BENIGN ESSENTIAL HTN: ICD-10-CM

## 2022-11-08 DIAGNOSIS — R73.01 IFG (IMPAIRED FASTING GLUCOSE): ICD-10-CM

## 2022-11-08 DIAGNOSIS — E66.9 OBESITY (BMI 30-39.9): Primary | ICD-10-CM

## 2022-11-08 NOTE — TELEPHONE ENCOUNTER
Eleni Carter Cha, RN  Massimo Knowles,     Can you please enter a Dietitian Referral for patient as she is seeing Marsha Ortega tomorrow for initial consult. Reminder to include any cardiovascular risk factor dx in addition to obesity dx.        DONE

## 2022-12-19 ENCOUNTER — OFFICE VISIT (OUTPATIENT)
Dept: INTERNAL MEDICINE CLINIC | Facility: CLINIC | Age: 57
End: 2022-12-19
Payer: COMMERCIAL

## 2022-12-19 VITALS
RESPIRATION RATE: 16 BRPM | SYSTOLIC BLOOD PRESSURE: 150 MMHG | BODY MASS INDEX: 28.93 KG/M2 | DIASTOLIC BLOOD PRESSURE: 90 MMHG | WEIGHT: 180 LBS | HEART RATE: 72 BPM | HEIGHT: 66 IN

## 2022-12-19 DIAGNOSIS — Z51.81 ENCOUNTER FOR THERAPEUTIC DRUG MONITORING: Primary | ICD-10-CM

## 2022-12-19 DIAGNOSIS — I10 BENIGN ESSENTIAL HTN: ICD-10-CM

## 2022-12-19 DIAGNOSIS — E66.9 OBESITY (BMI 30-39.9): ICD-10-CM

## 2022-12-19 PROCEDURE — 3008F BODY MASS INDEX DOCD: CPT | Performed by: NURSE PRACTITIONER

## 2022-12-19 PROCEDURE — 3080F DIAST BP >= 90 MM HG: CPT | Performed by: NURSE PRACTITIONER

## 2022-12-19 PROCEDURE — 3077F SYST BP >= 140 MM HG: CPT | Performed by: NURSE PRACTITIONER

## 2022-12-19 PROCEDURE — 99213 OFFICE O/P EST LOW 20 MIN: CPT | Performed by: NURSE PRACTITIONER

## 2022-12-19 RX ORDER — PHENTERMINE HYDROCHLORIDE 37.5 MG/1
37.5 TABLET ORAL EVERY MORNING
Qty: 30 TABLET | Refills: 2 | Status: SHIPPED | OUTPATIENT
Start: 2022-12-19

## 2022-12-19 RX ORDER — TOPIRAMATE 100 MG/1
100 TABLET, FILM COATED ORAL 2 TIMES DAILY
Qty: 180 TABLET | Refills: 1 | Status: SHIPPED | OUTPATIENT
Start: 2022-12-19

## 2022-12-19 RX ORDER — MELOXICAM 15 MG/1
15 TABLET ORAL DAILY
COMMUNITY
Start: 2022-12-01

## 2023-03-13 ENCOUNTER — OFFICE VISIT (OUTPATIENT)
Dept: INTERNAL MEDICINE CLINIC | Facility: CLINIC | Age: 58
End: 2023-03-13
Payer: COMMERCIAL

## 2023-03-13 VITALS
BODY MASS INDEX: 26.2 KG/M2 | HEART RATE: 70 BPM | OXYGEN SATURATION: 98 % | RESPIRATION RATE: 16 BRPM | DIASTOLIC BLOOD PRESSURE: 79 MMHG | HEIGHT: 66 IN | WEIGHT: 163 LBS | SYSTOLIC BLOOD PRESSURE: 122 MMHG

## 2023-03-13 DIAGNOSIS — I10 BENIGN ESSENTIAL HTN: ICD-10-CM

## 2023-03-13 DIAGNOSIS — E78.00 HYPERCHOLESTEREMIA: ICD-10-CM

## 2023-03-13 DIAGNOSIS — Z51.81 ENCOUNTER FOR THERAPEUTIC DRUG MONITORING: Primary | ICD-10-CM

## 2023-03-13 DIAGNOSIS — E66.9 OBESITY (BMI 30-39.9): ICD-10-CM

## 2023-03-13 PROCEDURE — 3074F SYST BP LT 130 MM HG: CPT | Performed by: NURSE PRACTITIONER

## 2023-03-13 PROCEDURE — 99213 OFFICE O/P EST LOW 20 MIN: CPT | Performed by: NURSE PRACTITIONER

## 2023-03-13 PROCEDURE — 3008F BODY MASS INDEX DOCD: CPT | Performed by: NURSE PRACTITIONER

## 2023-03-13 PROCEDURE — 3078F DIAST BP <80 MM HG: CPT | Performed by: NURSE PRACTITIONER

## 2023-03-13 RX ORDER — IBUPROFEN 800 MG/1
800 TABLET ORAL AS DIRECTED
COMMUNITY

## 2023-03-13 RX ORDER — METFORMIN HYDROCHLORIDE 750 MG/1
750 TABLET, EXTENDED RELEASE ORAL 2 TIMES DAILY WITH MEALS
Qty: 180 TABLET | Refills: 1 | Status: SHIPPED | OUTPATIENT
Start: 2023-03-13

## 2023-03-13 RX ORDER — PHENTERMINE HYDROCHLORIDE 37.5 MG/1
37.5 TABLET ORAL EVERY MORNING
Qty: 30 TABLET | Refills: 2 | Status: SHIPPED | OUTPATIENT
Start: 2023-03-13

## 2023-03-21 ENCOUNTER — OFFICE VISIT (OUTPATIENT)
Dept: INTERNAL MEDICINE CLINIC | Facility: CLINIC | Age: 58
End: 2023-03-21
Payer: COMMERCIAL

## 2023-03-21 DIAGNOSIS — E66.3 OVERWEIGHT WITH BODY MASS INDEX (BMI) 25.0-29.9: ICD-10-CM

## 2023-03-21 DIAGNOSIS — E78.00 HYPERCHOLESTEREMIA: ICD-10-CM

## 2023-03-21 DIAGNOSIS — R73.01 IFG (IMPAIRED FASTING GLUCOSE): ICD-10-CM

## 2023-03-21 DIAGNOSIS — I10 BENIGN ESSENTIAL HTN: ICD-10-CM

## 2023-03-21 PROCEDURE — 97803 MED NUTRITION INDIV SUBSEQ: CPT | Performed by: DIETITIAN, REGISTERED

## 2023-07-20 ENCOUNTER — TELEPHONE (OUTPATIENT)
Dept: INTERNAL MEDICINE CLINIC | Facility: CLINIC | Age: 58
End: 2023-07-20

## 2023-07-20 DIAGNOSIS — Z51.81 ENCOUNTER FOR THERAPEUTIC DRUG MONITORING: ICD-10-CM

## 2023-07-20 DIAGNOSIS — E66.9 OBESITY (BMI 30-39.9): ICD-10-CM

## 2023-07-20 NOTE — TELEPHONE ENCOUNTER
Requesting   Requested Prescriptions     Pending Prescriptions Disp Refills    PHENTERMINE HCL 37.5 MG Oral Tab [Pharmacy Med Name: phentermine 37.5 mg tablet] 30 tablet 2     Sig: Take 1 tablet (37.5 mg total) by mouth every morning. LOV: 3/13/23  RTC: 3 months  Filled: 3/13/23 #30 with 2 refills    No future appointments.

## 2023-07-21 NOTE — TELEPHONE ENCOUNTER
Pt called to schedule appt -booked 8/23 .  Pt would like to know if rx can be filled until this appt

## 2023-07-22 RX ORDER — PHENTERMINE HYDROCHLORIDE 37.5 MG/1
37.5 TABLET ORAL EVERY MORNING
Qty: 30 TABLET | Refills: 1 | Status: SHIPPED | OUTPATIENT
Start: 2023-07-22

## 2023-08-24 ENCOUNTER — PATIENT MESSAGE (OUTPATIENT)
Dept: INTERNAL MEDICINE CLINIC | Facility: CLINIC | Age: 58
End: 2023-08-24

## 2023-08-25 ENCOUNTER — OFFICE VISIT (OUTPATIENT)
Dept: INTERNAL MEDICINE CLINIC | Facility: CLINIC | Age: 58
End: 2023-08-25
Payer: COMMERCIAL

## 2023-08-25 VITALS
WEIGHT: 170 LBS | DIASTOLIC BLOOD PRESSURE: 80 MMHG | HEIGHT: 66 IN | BODY MASS INDEX: 27.32 KG/M2 | HEART RATE: 80 BPM | SYSTOLIC BLOOD PRESSURE: 124 MMHG | RESPIRATION RATE: 16 BRPM

## 2023-08-25 DIAGNOSIS — Z51.81 ENCOUNTER FOR THERAPEUTIC DRUG MONITORING: Primary | ICD-10-CM

## 2023-08-25 DIAGNOSIS — I10 BENIGN ESSENTIAL HTN: ICD-10-CM

## 2023-08-25 DIAGNOSIS — E66.3 OVERWEIGHT (BMI 25.0-29.9): ICD-10-CM

## 2023-08-25 DIAGNOSIS — E78.00 HYPERCHOLESTEREMIA: ICD-10-CM

## 2023-08-25 PROCEDURE — 3008F BODY MASS INDEX DOCD: CPT | Performed by: NURSE PRACTITIONER

## 2023-08-25 PROCEDURE — 3079F DIAST BP 80-89 MM HG: CPT | Performed by: NURSE PRACTITIONER

## 2023-08-25 PROCEDURE — 3074F SYST BP LT 130 MM HG: CPT | Performed by: NURSE PRACTITIONER

## 2023-08-25 PROCEDURE — 99213 OFFICE O/P EST LOW 20 MIN: CPT | Performed by: NURSE PRACTITIONER

## 2023-08-25 RX ORDER — METFORMIN HYDROCHLORIDE 750 MG/1
750 TABLET, EXTENDED RELEASE ORAL 2 TIMES DAILY WITH MEALS
Qty: 180 TABLET | Refills: 1 | Status: SHIPPED | OUTPATIENT
Start: 2023-08-25

## 2023-08-25 RX ORDER — PHENTERMINE HYDROCHLORIDE 37.5 MG/1
37.5 TABLET ORAL EVERY MORNING
Qty: 30 TABLET | Refills: 2 | Status: SHIPPED | OUTPATIENT
Start: 2023-08-25

## 2023-08-25 RX ORDER — TOPIRAMATE 100 MG/1
100 TABLET, FILM COATED ORAL 2 TIMES DAILY
Qty: 180 TABLET | Refills: 1 | Status: SHIPPED | OUTPATIENT
Start: 2023-08-25

## 2023-10-13 DIAGNOSIS — E66.3 OVERWEIGHT (BMI 25.0-29.9): ICD-10-CM

## 2023-10-13 DIAGNOSIS — Z51.81 ENCOUNTER FOR THERAPEUTIC DRUG MONITORING: ICD-10-CM

## 2023-10-13 RX ORDER — PHENTERMINE HYDROCHLORIDE 37.5 MG/1
37.5 TABLET ORAL EVERY MORNING
Qty: 30 TABLET | Refills: 1 | OUTPATIENT
Start: 2023-10-13

## 2023-10-16 ENCOUNTER — TELEPHONE (OUTPATIENT)
Dept: INTERNAL MEDICINE CLINIC | Facility: CLINIC | Age: 58
End: 2023-10-16

## 2023-10-16 NOTE — TELEPHONE ENCOUNTER
Patient left a message on Friday she does not know why her medicine refill was denied    Outpatient Medication Detail     Disp Refills Start End    Phentermine HCl 37.5 MG Oral Tab 30 tablet 2 8/25/2023     Sig - Route: Take 1 tablet (37.5 mg total) by mouth every morning. - Oral    Sent to pharmacy as: Phentermine HCl 37.5 MG Oral Tablet (ADIPEX-P)    E-Prescribing Status: Receipt confirmed by pharmacy (8/25/2023 11:22 AM CDT)    No prior authorization was found for this prescription. Found prior authorization for another prescription for the same medication: Denied      Associated Diagnoses    Encounter for therapeutic drug monitoring  - Primary      Overweight (BMI 25.0-29. 9)        Pharmacy    [id number], [address] [facility].  AT [address] [place] [address], [phone], 6

## 2023-12-04 ENCOUNTER — OFFICE VISIT (OUTPATIENT)
Dept: INTERNAL MEDICINE CLINIC | Facility: CLINIC | Age: 58
End: 2023-12-04
Payer: COMMERCIAL

## 2023-12-04 VITALS
HEART RATE: 84 BPM | HEIGHT: 66 IN | WEIGHT: 172 LBS | DIASTOLIC BLOOD PRESSURE: 76 MMHG | SYSTOLIC BLOOD PRESSURE: 120 MMHG | RESPIRATION RATE: 18 BRPM | OXYGEN SATURATION: 98 % | BODY MASS INDEX: 27.64 KG/M2

## 2023-12-04 DIAGNOSIS — Z51.81 ENCOUNTER FOR THERAPEUTIC DRUG MONITORING: Primary | ICD-10-CM

## 2023-12-04 DIAGNOSIS — E66.3 OVERWEIGHT (BMI 25.0-29.9): ICD-10-CM

## 2023-12-04 DIAGNOSIS — I10 BENIGN ESSENTIAL HTN: ICD-10-CM

## 2023-12-04 PROCEDURE — 99213 OFFICE O/P EST LOW 20 MIN: CPT | Performed by: NURSE PRACTITIONER

## 2023-12-04 PROCEDURE — 3078F DIAST BP <80 MM HG: CPT | Performed by: NURSE PRACTITIONER

## 2023-12-04 PROCEDURE — 3008F BODY MASS INDEX DOCD: CPT | Performed by: NURSE PRACTITIONER

## 2023-12-04 PROCEDURE — 3074F SYST BP LT 130 MM HG: CPT | Performed by: NURSE PRACTITIONER

## 2023-12-04 RX ORDER — PHENTERMINE HYDROCHLORIDE 37.5 MG/1
37.5 TABLET ORAL EVERY MORNING
Qty: 30 TABLET | Refills: 2 | Status: SHIPPED | OUTPATIENT
Start: 2023-12-04

## 2023-12-05 ENCOUNTER — TELEPHONE (OUTPATIENT)
Dept: INTERNAL MEDICINE CLINIC | Facility: CLINIC | Age: 58
End: 2023-12-05

## 2023-12-05 NOTE — TELEPHONE ENCOUNTER
PA entered in [facility] JOE for Zepbound  I attached files showing she has been on saxenda and phentermine and made note that wegovy is on nationwide back order in low doses which is what she needs.   Awaiting decision    Desiree Pineda (Wu: [id number])

## 2023-12-06 NOTE — TELEPHONE ENCOUNTER
Thank you for all that work. Please notify patient of denial despite hx of previous therapies, SEs on Saxenda and no Wegovy to start. Advise to review AVS and take action on lifestyle pieces as instructed.

## 2023-12-06 NOTE — TELEPHONE ENCOUNTER
PA for zepbound denied stating she must try and fail or have contraindication for preferred drugs: orlistat, saxenda, qsymia, wegovy    I listed wegovy is on back order and that she tried saxenda and phentermine - still denied  Fax 12/5/23 1:10 from [facility]    Please recommend

## 2024-02-08 ENCOUNTER — TELEPHONE (OUTPATIENT)
Dept: INTERNAL MEDICINE CLINIC | Facility: CLINIC | Age: 59
End: 2024-02-08

## 2024-02-08 NOTE — TELEPHONE ENCOUNTER
Patient left a message to call her about refill for phentermine.  I called back and got voicemail  Left message that RX was sent 12/4/23 for one month plus 2 refills - should have a refill to get at [facility] in [place]  Let me know if there is a problem.

## 2024-02-15 DIAGNOSIS — E66.3 OVERWEIGHT (BMI 25.0-29.9): ICD-10-CM

## 2024-02-15 DIAGNOSIS — Z51.81 ENCOUNTER FOR THERAPEUTIC DRUG MONITORING: ICD-10-CM

## 2024-02-15 NOTE — TELEPHONE ENCOUNTER
Requesting   Requested Prescriptions     Pending Prescriptions Disp Refills    TOPIRAMATE 100 MG Oral Tab [Pharmacy Med Name: TOPIRAMATE 100 MG TABLET] 180 tablet 1     Sig: TAKE 1 TABLET BY MOUTH TWICE A DAY     LOV: 12/4/23  RTC: 3 months  Filled: 8/25/23 #180 with 1 refills    No future appointments.   `

## 2024-02-18 RX ORDER — TOPIRAMATE 100 MG/1
100 TABLET, FILM COATED ORAL 2 TIMES DAILY
Qty: 180 TABLET | Refills: 0 | Status: SHIPPED | OUTPATIENT
Start: 2024-02-18

## 2024-03-18 ENCOUNTER — TELEPHONE (OUTPATIENT)
Dept: INTERNAL MEDICINE CLINIC | Facility: CLINIC | Age: 59
End: 2024-03-18

## 2024-03-18 NOTE — TELEPHONE ENCOUNTER
Pt left a message she is having trouble filling her phentermine - it needs pa which we do not do.  I told pharmacy to get it ready and she will pay out of pocket.  I called patient to inform her she needs to pay out of pocket and to get a coupon.    Outpatient Medication Detail     Disp Refills Start End    Phentermine HCl 37.5 MG Oral Tab 30 tablet 2 12/4/2023 --    Sig - Route: Take 1 tablet (37.5 mg total) by mouth every morning. - Oral    Sent to pharmacy as: Phentermine HCl 37.5 MG Oral Tablet ([id number])    E-Prescribing Status: Receipt confirmed by pharmacy (12/4/2023 10:43 AM CST)    No prior authorization was found for this prescription.    Found prior authorization for another prescription for the same medication: Denied      Pharmacy    [id number] [place] [facility] [address] AT [facility], [phone], [phone]

## 2024-04-22 DIAGNOSIS — Z51.81 ENCOUNTER FOR THERAPEUTIC DRUG MONITORING: ICD-10-CM

## 2024-04-22 DIAGNOSIS — E66.3 OVERWEIGHT (BMI 25.0-29.9): ICD-10-CM

## 2024-04-22 RX ORDER — METFORMIN HYDROCHLORIDE 750 MG/1
750 TABLET, EXTENDED RELEASE ORAL 2 TIMES DAILY WITH MEALS
Qty: 180 TABLET | Refills: 0 | Status: SHIPPED | OUTPATIENT
Start: 2024-04-22

## 2024-04-22 NOTE — TELEPHONE ENCOUNTER
Requesting metformin  LOV: 12/23/23  RTC: 3 months  Filled: 8/25/23 #180 with 1 refills    No future appointments.    Due for OV sending Graspr message.

## 2024-05-20 DIAGNOSIS — E66.3 OVERWEIGHT (BMI 25.0-29.9): ICD-10-CM

## 2024-05-20 DIAGNOSIS — Z51.81 ENCOUNTER FOR THERAPEUTIC DRUG MONITORING: ICD-10-CM

## 2024-05-21 RX ORDER — TOPIRAMATE 100 MG/1
100 TABLET, FILM COATED ORAL 2 TIMES DAILY
Qty: 180 TABLET | Refills: 0 | OUTPATIENT
Start: 2024-05-21

## 2024-05-21 NOTE — TELEPHONE ENCOUNTER
Requesting   Requested Prescriptions     Pending Prescriptions Disp Refills    TOPIRAMATE 100 MG Oral Tab [Pharmacy Med Name: TOPIRAMATE 100 MG TABLET] 180 tablet 0     Sig: TAKE 1 TABLET BY MOUTH TWICE A DAY     LOV: 12/4/23  RTC: 3 months  Filled: 2/18/24 #180 with 0 refills    No future appointments.  Needs appt

## 2024-06-01 DIAGNOSIS — E66.3 OVERWEIGHT (BMI 25.0-29.9): ICD-10-CM

## 2024-06-01 DIAGNOSIS — Z51.81 ENCOUNTER FOR THERAPEUTIC DRUG MONITORING: ICD-10-CM

## 2024-06-03 DIAGNOSIS — Z51.81 ENCOUNTER FOR THERAPEUTIC DRUG MONITORING: ICD-10-CM

## 2024-06-03 DIAGNOSIS — E66.3 OVERWEIGHT (BMI 25.0-29.9): ICD-10-CM

## 2024-06-03 RX ORDER — PHENTERMINE HYDROCHLORIDE 37.5 MG/1
37.5 TABLET ORAL EVERY MORNING
Qty: 30 TABLET | Refills: 2 | OUTPATIENT
Start: 2024-06-03

## 2024-06-03 NOTE — TELEPHONE ENCOUNTER
Requesting   Requested Prescriptions     Pending Prescriptions Disp Refills    PHENTERMINE HCL 37.5 MG Oral Tab [Pharmacy Med Name: PHENTERMINE 37.5 MG TABLET] 30 tablet 2     Sig: TAKE 1 TABLET BY MOUTH EVERY MORNING.     LOV: 12/4/23  RTC: 3 months  Filled: 12/4/23 #30 with 2 refills    No future appointments.  Needs appt

## 2024-06-04 ENCOUNTER — TELEPHONE (OUTPATIENT)
Dept: INTERNAL MEDICINE CLINIC | Facility: CLINIC | Age: 59
End: 2024-06-04

## 2024-06-04 NOTE — TELEPHONE ENCOUNTER
Pt left a message she is now scheduled and wants a refill of her med.  She was denied phentermine  Her last visit was 12/4/23  She has made no appts until now and cannot get in until September  Clinic rule must be seen every 6 months especially for controlled - she cannot get refill

## 2024-06-06 NOTE — TELEPHONE ENCOUNTER
Patient did not read my chart.  She left another message asking for her refill.  I called her back and explained the rules and safety issues.  I informed her she can sign up for waiting list and to check schedule but she must be seen before a refill can be given.

## 2024-07-21 DIAGNOSIS — Z51.81 ENCOUNTER FOR THERAPEUTIC DRUG MONITORING: ICD-10-CM

## 2024-07-21 DIAGNOSIS — E66.3 OVERWEIGHT (BMI 25.0-29.9): ICD-10-CM

## 2024-07-22 NOTE — TELEPHONE ENCOUNTER
Requesting   Requested Prescriptions     Pending Prescriptions Disp Refills    METFORMIN  MG Oral Tablet 24 Hr [Pharmacy Med Name: METFORMIN HCL  MG TABLET] 180 tablet 0     Sig: TAKE 1 TABLET BY MOUTH 2 TIMES DAILY WITH MEALS.       LOV: 12/4/23  RTC: 3 months   Last Relevant Labs:   Filled: 4/22/24 #180 with 0 refills    Future Appointments   Date Time Provider Department Center   9/30/2024 10:40 AM Almaz Clifton APRN [id number]

## 2024-07-24 RX ORDER — METFORMIN HYDROCHLORIDE 750 MG/1
750 TABLET, EXTENDED RELEASE ORAL 2 TIMES DAILY WITH MEALS
Qty: 180 TABLET | Refills: 0 | OUTPATIENT
Start: 2024-07-24

## 2024-07-24 NOTE — TELEPHONE ENCOUNTER
Refused Patient is over due for follow up and will require visit for medication refills and any dose titrations not documented on previous AVS. Please advise patient to schedule, add self to wait list, and monitor my schedule daily for openings due to cancellations, most commonly found over the weekend, for a Monday appointment in [place]. I also recommend patient schedules in advance for future appointments, typically after their next appointment, to avoid disruption in their treatment plan. September appointment too far out. Will likely need to remain off tx until seen, unless able to find cancellation. Thank you.

## 2024-09-16 ENCOUNTER — OFFICE VISIT (OUTPATIENT)
Dept: INTERNAL MEDICINE CLINIC | Facility: CLINIC | Age: 59
End: 2024-09-16
Payer: COMMERCIAL

## 2024-09-16 VITALS
HEIGHT: 66 IN | WEIGHT: 161 LBS | SYSTOLIC BLOOD PRESSURE: 118 MMHG | RESPIRATION RATE: 16 BRPM | HEART RATE: 58 BPM | BODY MASS INDEX: 25.88 KG/M2 | DIASTOLIC BLOOD PRESSURE: 72 MMHG

## 2024-09-16 DIAGNOSIS — I10 BENIGN ESSENTIAL HTN: ICD-10-CM

## 2024-09-16 DIAGNOSIS — E66.3 OVERWEIGHT (BMI 25.0-29.9): ICD-10-CM

## 2024-09-16 DIAGNOSIS — Z51.81 ENCOUNTER FOR THERAPEUTIC DRUG MONITORING: Primary | ICD-10-CM

## 2024-09-16 PROCEDURE — 3074F SYST BP LT 130 MM HG: CPT | Performed by: NURSE PRACTITIONER

## 2024-09-16 PROCEDURE — 99213 OFFICE O/P EST LOW 20 MIN: CPT | Performed by: NURSE PRACTITIONER

## 2024-09-16 PROCEDURE — 3008F BODY MASS INDEX DOCD: CPT | Performed by: NURSE PRACTITIONER

## 2024-09-16 PROCEDURE — 3078F DIAST BP <80 MM HG: CPT | Performed by: NURSE PRACTITIONER

## 2024-09-16 RX ORDER — PHENTERMINE HYDROCHLORIDE 37.5 MG/1
37.5 TABLET ORAL EVERY MORNING
Qty: 30 TABLET | Refills: 3 | Status: SHIPPED | OUTPATIENT
Start: 2024-09-16

## 2024-09-16 RX ORDER — METFORMIN HYDROCHLORIDE 750 MG/1
750 TABLET, EXTENDED RELEASE ORAL 2 TIMES DAILY WITH MEALS
Qty: 180 TABLET | Refills: 0 | Status: CANCELLED | OUTPATIENT
Start: 2024-09-16

## 2024-09-16 RX ORDER — TOPIRAMATE 100 MG/1
100 TABLET, FILM COATED ORAL 2 TIMES DAILY
Qty: 180 TABLET | Refills: 0 | Status: CANCELLED | OUTPATIENT
Start: 2024-09-16

## 2024-09-16 NOTE — PATIENT INSTRUCTIONS
Continue making lifestyle changes that focus on good nutrition, regular exercise and stress management.    Medication Plan: Restart Phentermine at 1/2 tab daily and maintain with option to increase after 7 days if needed.    Agreed upon goal/s before next office visit include: Great work with maintaining balanced nutrition and regular fitness with strength/resistance training! Recommend Power or Sculpt Yoga to add another element of fitness. Tips on travel and the holiday listed below.    Protein Pumpkin Pancakes:    Mix in :   1/2 cup cottage cheese  1/2 cup pumpkin  2 eggs  1 Tsp. Pumpkin Pie Spice    Transfer to bowl and hand mix in 1/2 cup  Joes Pumpkin Pancake mix. Pour on griddle and cook.      YOGA IS NOT A 4-LETTER WORD    by Lupis Brooke    Obesity [employer] Coalition Fall 2012  https://www.obesityaction.org/resources/yoga-is-not-a-4-letter-word/    DISCLAIMER: To develop an exercise program that best suits your needs, please consult with your physician. It is important to talk with your doctor before beginning any exercise program.    Losing weight and improving your health can often be a difficult journey. You may feel limited to the types of exercises you can perform. Yoga is an excellent low-impact exercise to help you strengthen your core and increase flexibility. The benefits of yoga can be yours regardless of your size!    What can yoga do for you?  Yoga is a discipline that’s thousands of years old. The main physical benefits of yoga are well documented:    Reduce Stress  Improve Balance  Increase Flexibility  Develop Core Strength    People come to the yoga mat wanting their physical bodies to change. However, it’s the feeling of well-being that brings people back to their yoga practice.    When asked, “What do you do?” I often see the look of disbelief creep across faces as I reply, “I’m a .” As a woman affected by obesity, I do not fit the image of a , leia  or triathlete. Yet, that is who I am.    I see this same look of disbelief when I tell a person affected by obesity that they can do yoga right now in the body that they have today. Countless times I’ve been told that someone would do yoga, but only after they’ve lost weight. Unfortunately, this eliminates yoga as a tool for reclaiming their health based on their idea that yoga is only for the already thin and flexible. In fact, yoga can be done by everyone -- lying in bed, sitting in a wheelchair or standing only for brief moments, the benefits of yoga can still be yours.    Yoga as a Valuable Tool for Weight-loss  First time yoga students are often surprised at how much energy and effort it takes to come into and hold even the most simple of yoga poses. However, with yoga, it is not just calorie burning that best supports weight-loss.    Often, options to reclaim your health can be overwhelming. What to do is only the beginning of the process, as the answers to “who, where and when” can cloud and confuse the mind -- leading to no action at all. The easiest way to quiet the mind for clearer thinking is do Deep Belly Breathing (see sidebar) and focus on the words “inhale” and “exhale” to the rhythm of your breath.    Mindfulness is another benefit of yoga that’s often overlooked. Setting your intentions or goals is an important footprint to your success. Some programs require close attention to guidelines to ensure health. Bringing awareness to your life choices, yoga encourages and reminds you to match your actions to your goals.    How to Start Your Yoga Practice  The best way to start a yoga practice is to sprinkle yoga into your daily life. Little pieces of yoga throughout the day will bring you huge benefits with ease - Deep Belly Breathing at a red light or during commercials while watching TV; standing in Mountain pose (see sidebar) while your coffee brews or the microwave is cooking; or gentle seated twists  (see sidebar) in front of the computer while it starts or when you find yourself on hold. Taking any movement you have and holding it a little longer, reaching a little farther, will all be beneficial even if you’re confined to a bed.    When you decide it’s time to find a , location is still the first question to answer. You are more likely to be consistent if the class is convenient to your home or workplace. Call or contact the teacher by email to have a chance to talk about what you want and if they have a class that will fit your needs. Remember, you are the customer. Coming early to class will enable you to find a spot that supports your comfort zone. Some of us like to be in the back or close to a wall. Others want to be up front so we can see everything that’s going on.    If that first location isn’t for you, keep trying. Know that the right teacher and the right class are out there for you. Don’t suffer or spend your money on a class that isn’t working for you, but don’t give up either.    Once you’ve found a class, give yourself permission not to do every pose that’s being taught. Listen for what the foundation movement is, as well as the benefit. From that information, move in a way that makes sense to your body. Go inward and remember -- there’s never any pain in yoga.    Yoga for Everyone!    3-Part Deep Belly Breath    This can be done anywhere and in any position -- seated, standing or even lying down. Breathing through your nose, soften your diaphragm. Inhale deeply expanding your lungs from bottom, middle to top. On the exhale, release from top, middle to bottom. Go slow. Enjoy the expansion of your lung capacity while you improve your cardiovascular exchange. Great for stress reduction -- I especially like to do this while waiting in the doctor’s office.    Simple Seated Twist    Sit so you’re not touching the back of your chair with your feet comfortably apart planted to the floor. On  an exhale, reach across your body with your right hand to left knee or leg. The left hand goes back and can be supported on the seat or the back of the chair. Keep the energy of the hips grounded as you feel the twist deepen from hip to top of the head. Your eyes should look left in the direction of the twist. Breathe in shallow breaths that don’t interfere with the squeeze. Keep your belly relaxed. Hold this twist with the spine tall, even though it’s in rotation. Coming out, inhale back to center and twist to the other side. Twists are great to stimulate and cleanse all the organs and soft tissues of the torso.    Standing Mountain Pose    Stand with your feet at true hip-width apart. We often believe our hips are farther apart than they really are. Reach in from the front and find your hipbones. Place your feet directly below your new found hips -- it’s okay if your thighs squeeze! The important thing is to honor the alignment of your frame, not the flesh. Set your feet so the outside edges are parallel. This will make you feel a little pigeon-toed, causing your knees to be soft. (Never stand with locked knees.) Engage both your abdominal and gluteal core. A gentle press of the shoulder blades will open your heart center and give you a feeling of lightness. You can stand like this anywhere and no one will even know you’re practicing yoga.    To add arm work, on an inhale, lift the arms out to the sides and up overhead framing the head at the ears, palms facing not touching. Hold and feel the energy from your feet to your fingertips. On an exhale, release the arms down and soften the whole body as you release the pose.    My 3 A’s  Yoga can bring to you what I call the Three A’s: Awareness, Acceptance and Affection. As you build your yoga practice, you’ll find yourself aware of your body in a new way. Your body’s edges will become clearer. Your everyday movements will deepen.    From awareness, you’ll begin to  notice how different your body is day-to-day, and so begin to accept those differences -- especially the ones you can never change.    Finally, it is my deepest wish that you will come to love your body just as it is in the moment. Please remember, permanent changes come from love, not from hate -- and you deserve to be loved now and always.      Staying Healthy While Traveling    by Annalisa Figueroa RD    OAC at www.obesityaction.org Summer 2023 Resource    It’s time to go on a vacation! Summertime is a popular season for travel, and vacations are a wonderful way to unwind, discover new places, and spend time with family and friends. Taking a break from your daily routine is really important. When you return, you’ll feel refreshed and ready for the next things you need to do! As you pack your bags and plan your trip, remember to stay focused on your health and wellness goals. With a bit of planning, it’s not hard to stay active, eat well, and have a great time during your vacation.    Do Your Research and Plan Ahead  Keeping yourself on track during a vacation might feel like an impossible challenge. However, with the right preparation, it’s entirely achievable. Before you leave, take some time to plan your vacation. Look for ways to include health and nutrition in your itinerary, such as trying new activities and foods. You’ll be pleasantly surprised to find that many places have great options for staying healthy.    Check your Nutrition  Instead of constantly eating sweets, fried foods and junk, seek out healthier alternatives. Choosing nutritious foods while traveling has many advantages. First, eating healthy will give you more energy, which is important for having a fun vacation. Second, sticking to your plan will give you a sense of accomplishment and satisfaction. Here are some tips to help you choose wisely and prioritize your well-being.    Consider using grocery delivery services. You can shop online from  home and have meals and snacks delivered to your hotel. Just make sure your room has a refrigerator. This way, you won’t have to search for a store and can avoid the temptation of unhealthy vacation treats. You can stock up on fresh produce, cheese sticks, popcorn and protein bars to keep in your hotel room.    Make eating out fun by finding new restaurants to try at your destination. Take a moment to look up their menus online and come up with a plan. You can plan to try fresh fish at a local beach restaurant, visit a steakhouse and choose a lean cut of meat with a side salad, or enjoy chicken fajitas at a Mexican restaurant.  Remember to give yourself a break. It’s okay to indulge in a special meal or snack during your vacation. One treat doesn’t mean the whole day is ruined. Just get back on track with your healthy eating at the next meal.    Finding Fitness  Discovering fun fitness activities can help you build a habit that you’ll want to continue. When you’re on vacation, make it a point to set aside time to move your body. This will not only increase your energy levels but also make you feel positive and satisfied about being active. Be creative and think outside the box to come up with exciting ideas to stay active during your vacation!    Try to find ways to move throughout the day. Look for trails in a local park, a gym at your hotel, or join an exercise class for the day. Keep things interesting by varying your activities and trying something new.  Involve the whole family in staying active. Rent bikes for an afternoon, plan a walk after dinner, or try something different like surfing! You can also let your kids choose an activity. You might be surprised to hear what ideas they have!    Mind Your Mental Health  Vacations are meant to be relaxing and a chance to recharge. Make sure you have a vacation that helps you rest and rejuvenate! Sometimes it’s easy to plan too many activities and take on too many  commitments. Take a moment to find balance between fun and fitness.    Pack items you enjoy. Bring your favorite books, puzzles, music, or whatever you love to ensure you have time to do just that. Sneaking away for a few minutes can give you time to spend on your favorite things.  Remember to rest. Vacations can be full of exciting things, but it’s also important to take it easy. Take an afternoon nap or find some quiet time alone to recharge.    Include activities that you enjoy. Vacations offer a lot of things to do, so make sure you have activities that you personally enjoy along with the rest of the family.    How to: Make it Through a Long Day of Travel  Whether you’re taking a road trip for a few hours or a plane across the [place] travel days require some planning. Here are some tips to make your trip easier.    Pack a snack bag for long road trips or airport days. When in the car, consider bringing a cooler with water to avoid sugary snacks at the gas stations. Pack fresh fruits, vegetables, water, cheese and meats. Include snacks like popcorn, pretzels or whole-grain crackers. Airline travel can be more challenging, but you can pack snacks such as protein bars, trail mix or popcorn in your carry-on. Bring a refillable water bottle to stay hydrated throughout the day!    Find ways to walk, even on your busiest travel day. If you’re driving, take quick 10-15-minute walks every couple of hours. It will not only give you some cardio exercise but also reduce stress and improve your mood during long car rides. If you’re stuck in an airport, walk up and down the terminal while you wait.  Bring items like books, adult coloring books or knitting supplies to keep busy and reduce screen time. This can be a great opportunity to explore a new hobby and let your creative juices flow.  How to: Make Healthy Food Choices When Eating Out  Making healthy food choices while eating out on vacation can be challenging.    Instead  of donuts and pastries for breakfast, go for high-protein items like eggs, turkey sausage, turkey mtz, oats and whole-grain toast. Ask for fresh fruit or low-fat yogurt on the side to make breakfast even more nutritious.  Restaurant portions can be large. If someone in your group is willing to split a meal, that can be an easy solution. Since you might not have enough space to take leftovers with you, you may have to leave some behind.  When looking at the lunch or dinner menu, choose grilled, baked or boiled options. This can save you a lot of calories and fat. Be mindful of side dishes, as they can add up quickly. Consider swapping a high-calorie side item for a side salad, fruit or vegetables.    Desserts can be tempting, but plan ahead if you want to order one. Choose a lower-calorie meal to balance it out. You can also share the dessert with others at your table to enjoy a smaller portion.  How to: Deal With an unexpected change of plans  We’ve all been there. Your perfectly planned vacation goes sideways. How do you make the most of these days? It can be easy to throw in the towel, but always be ready with a backup plan.    The weather can change unexpectedly. A rainy day might ruin your beach or mountain hike plans. Take on the challenge and find new ways to have fun. Look for an indoor pool or an activity center to keep working towards your goals!    Your carefully chosen restaurant may not have healthy options, or your grocery store delivery might be late. Don’t give up; just adapt and make a new decision. Make the best choice you can and move on to the next meal. It’s okay if it’s not perfect!    Sometimes your perfectly planned day doesn’t go as expected. Kids may start fighting during a trip to the park, or someone might get sick on vacation. Focus on the positive moments and enjoy the time you have.  Wherever you go on your summer vacation, make sure to enjoy the break, relaxation and adventure.  Taking time away from your usual routine can be amazing, and keeping up with your health and fitness goals can make your vacation even better.    Holiday Weight and How to Avoid It    Obesity Action Coalition by Madelin Kate, PhD  https://www.obesityaction.org/resources/holiday-weight-and-uzl-pt-rotbr-it/      When we think about the holidays many things come to mind - gifts, shopping, parties, family, decorating, long to-do lists --and delicious holiday treats.    Strategies for Avoiding Holiday Weight Gain  The holiday season is a busy time, and there are eating opportunities everywhere we go, such as family gatherings, office parties with trays of home-baked treats in the lunchroom, holiday and [id number]-[id number] programs at our kids’ schools, treat samples being given away as we make our way through the stores to do our holiday shopping and catalogs in our mailboxes with mouth-watering photo spreads on every page. We’re really busy, perhaps too busy to prepare the healthy meals we might otherwise prepare.    Here are a few tips that will help you negotiate this joyful time with minimum risk to your weight management goals:    Focus on maintaining your current weight. Challenging yourself to lose weight over the holidays is setting yourself up for failure.  Don’t gorge on any special holiday food because you only get to eat it once a year. With luck, you’ll still be around to enjoy it next year. On the other hand, don’t deprive yourself of anything you want to taste. Instead, take a mindful bite, savoring the sight, taste, aroma, mouth feel and sound of each special holiday treat. Eating like this leads to increased pleasure, quicker satisfaction and decreased risk for weight gain.  Avoid the trap of thinking you can eat what you want because you can just start over in the New Year. It doesn’t get any easier just because it’s January - there are always other reasons to indulge and to celebrate.  Keep up your  exercise routine. This will also help reduce holiday stress.  Keep tabs on yourself. Write down what you eat, weigh yourself if you want to or try on your favorite clothes to make sure they still fit.  Create meaning beyond the food by creating new traditions that have nothing to do with food. For example, change “in our family we always have chocolate cinnamon bread with whipped cream on Christmas morning” into “in our family we always play in the snow (or on the beach), or go for a long walk/take food and gifts to the homeless shelter on [place] morning.”  Sometimes, eating a particular food is our way of remembering a lost loved one. If that applies to you, find another way to remember them, like sharing memories with family members.  Remember all the reasons why reaching and maintaining a healthy weight is important to you.  Remember, unless you’re an elite athlete, you’re unlikely to be able to “exercise off” weeks of overindulgence.  Strategies for Holiday Parties  Most of us love holiday parties and look forward to them all year. We get to dress up and go to nice places, spend time with our nearest and dearest, enjoy our favorite holiday music and engage in the traditions that are meaningful to us. Despite all of the excitement, parties can also be minefields when it comes to honoring our healthy lifestyle goals. When we love parties, we may over-indulge as a way of intensifying the positive emotions we’re already feeling, and when we dislike parties, we may over-indulge in an effort to distract ourselves from the emotional discomfort that we’re feeling.    Here are a few tips that will help you get through holiday parties without sabotaging your goals:    Avoid wearing baggy clothing that allows you to expand as you eat.  After you’ve eaten, stay away from the food tables at the party.  Keep your hands busy by finding a way to help out. It’s the best way to distract yourself from the food.  Avoid alcohol.  When we drink, we’re more likely to abandon healthy eating.  Fill up with water and other low-calorie drinks.  Take a healthy dish for the pot luck - something you can eat: consider salad, fruit, raw vegetables and a healthy dip.  Focus on your relationships, not on the food - learn to focus on enjoying the people and the special holiday experiences, on building special memories for yourself and your family.  Meeting new people is another good way of distracting yourself from the food. If you’re shy, simply be a good listener.  Plan ahead. The best kind of plan, when it comes to food, is about what you are going to eat - not about what you’re not going to eat. If we focus on what we can’t eat (or what we think we shouldn’t eat), this kind of thinking can set us up for failure because it simply leaves us feeling deprived.  Don’t arrive completely famished - you’ll be more likely to eat in a way you’ll later regret. Plan to eat on the light side both before and after the event. Think about your meal plan for the day, and leave yourself some room to eat at the party.  Coping with Holiday Stress  As you know, the holiday season can be joyful and stressful at the same time. There’s so much to do, being around family can sometimes be difficult and often, we set ourselves the goal of creating the “perfect” holiday. Being stressed puts us at risk for stress-based eating in an effort to cope.    Here are some strategies you can use to reduce your stress levels:    Focus on what you’re grateful for.  Practice deep breathing whenever you feel overwhelmed.  Keep up your exercise routine.  Remind yourself to do just one thing at a time.  Remember -- you cannot do more than your best.  Be willing to say “no” to some events, tasks or requests. Sometimes this is the best way we can take care of ourselves.  Create a holiday season schedule for yourself. Schedule and prioritize everything you need to get done.  Reduce your  expectations - aim for “good enough,” not “perfect.”  If you’re alone during the holidays, pamper yourself and find a way to help others who are less fortunate. This will help reduce your loneliness.  If your relationships with family members are strained, remember that over-indulging in your favorite holiday comfort foods is not going to change how they behave towards you!  Create fun times for yourself. Having fun is a great way of reducing stress!  I hope these tips will help you not just get through the holidays, but that they’ll allow you to feel reassured that you can still have a fun and meaningful time without having to sacrifice your weight management goals. Wishing you a happy, healthy and meaningful holiday season!

## 2024-09-16 NOTE — PROGRESS NOTES
Christel Cantor is a 58 year old female presents today for follow-up on medical weight loss program for the treatment of overweight, obesity, or morbid obesity with associated HTN.    S:  Current weight   Wt Readings from Last 6 Encounters:   09/16/24 161 lb (73 kg)   12/04/23 172 lb (78 kg)   08/25/23 170 lb (77.1 kg)   03/13/23 163 lb (73.9 kg)   12/19/22 180 lb (81.6 kg)   09/19/22 183 lb (83 kg)    AND BMI Body mass index is 25.99 kg/m²..    Patient has lost 11# since LOV in 12/2023. She has been off medication and working with a  3x/week. Focus is on 4 oz of protein, veggies and fruit. Cut out oils with cooking as well. Feeling good about success and seeing weight loss. Increased stress with daughter's health and requirement of surgery. She will be traveling to see her and provide support.    [facility] Medical Weight Loss Follow Up    Question 9/16/2024  3:17 AM CDT - Filed by Patient   Please describe a success moment: Weight loss   Please describe a challenging moment/needs for improvement: Staying positive and getting toned   Please complete this 24 hour food journal, listing everything you had to eat in the past day. Include the average time of day you ate these meals at    List foods, qty and prep for breakfast:    List foods, qty and prep for lunch. 6 oz cottage cheese, 4 oz snow pea pods and mini bell peppers, 4 oz kiwi and melon   List foods, qty and prep for dinner. 5 oz sirloin steak, 4 oz cherry tomatoes & carrots, cucumber, arnoldo lettuce and 2 apples   List foods, qty and prep for snacks.    List the types and qty of fluids consumed 80 oz water   On average, how many meals did you eat out per week? 1   Exercise    How many days per week are you active or exercise 4   On average, how many days were anaerobic (strength/resistance) exercises performed? 3   On average, how many days were aerobic (cardio) exercises performed? 1   Perceived level of exertion on a scale of 1-5, with 5 being very  intense: 4   Stress    Average stress level on a scale of 1-10, with 10 being extremely stressed: 8   If greater than 5/1O how would you grade your coping mechanisms? moderate   Sleep hours and integrity    How many hours of uninterrupted sleep do you get a night: 4   Do you feel rested in the morning: No   If no, what may have been disrupting your sleep? going to the bathroom and racing mind   Please list any goal(s) for your next visit continued weight  loss and toning     Social hx and PMH reviewed. Homemaker, . 4 kids. Hx of dietician consult with Rafael Guerrero in 1/2016 at weight of 197#. Recommendation was: Estimated caloric needs for weight loss: 1,250 cals/d for 1.5 pounds/week weight loss.    REVIEW OF SYSTEMS:  GENERAL: feels well otherwise  LUNGS: denies shortness of breath with exertion  CARDIOVASCULAR: denies chest pain on exertion, denies palpitations or pedal edema  GI: denies abdominal pain.  No N/V/D/C  MUSCULOSKELETAL: see above  NEURO: denies headaches or dizziness  PSYCH: denies change in behavior or mood, denies feeling sad or depressed. Patient verbalizes understanding    EXAM:  /72   Pulse 58   Resp 16   Ht 5' 6\" (1.676 m)   Wt 161 lb (73 kg)   LMP 10/10/2015   BMI 25.99 kg/m²   GENERAL: well developed, well nourished, in no apparent distress, overweight  EYES: conjunctiva pink, sclera non icteric  LUNGS: CTA in all fields, breathing non labored  CARDIO: RRR without murmur, normal S1 and S2 without clicks or gallops, no pedal edema.   GI: +BS  NEURO/MS: motor and sensory grossly intact  PSYCH: pleasant, cooperative, normal mood and affect    ASSESSMENT AND PLAN:  Encounter Diagnoses   Name Primary?    Encounter for therapeutic drug monitoring Yes    Overweight (BMI 25.0-29.9)     Benign essential HTN          No orders of the defined types were placed in this encounter.      Meds & Refills for this Visit:  Requested Prescriptions     Signed Prescriptions Disp Refills     Phentermine HCl 37.5 MG Oral Tab 30 tablet 3     Sig: Take 1 tablet (37.5 mg total) by mouth every morning.       Imaging & Consults:  None        Plan:  Patient has lost 11# since LOV in 12/2023 off medication with hx of phenermine 37.5 mg daily, Topamax 100 mg BID, Metformin ER 1500 mg daily with a total weight loss of 36# since initial consult on 1/12/2016 with initial weight of 197#. Weight loss goal: size 8, 10-15# weight loss additional (140-145#). BP controlled. Restart phentermine as directed. No AOM coverage. No Contrave d/t cost. No Saxenda d/t SEs of nausea and cost.  on holiday and travel tips, adding Yoga. See patient instructions below for additional plans and patient counseling.        Patient Instructions   Continue making lifestyle changes that focus on good nutrition, regular exercise and stress management.    Medication Plan: Restart Phentermine at 1/2 tab daily and maintain with option to increase after 7 days if needed.    Agreed upon goal/s before next office visit include: Great work with maintaining balanced nutrition and regular fitness with strength/resistance training! Recommend Power or Sculpt Yoga to add another element of fitness. Tips on travel and the holiday listed below.    Protein Pumpkin Pancakes:    Mix in :   1/2 cup cottage cheese  1/2 cup pumpkin  2 eggs  1 Tsp. Pumpkin Pie Spice    Transfer to bowl and hand mix in 1/2 cup  Joes Pumpkin Pancake mix. Pour on griddle and cook.      YOGA IS NOT A 4-LETTER WORD    by Lupis Brooke    Obesity Action Coalition Fall 2012  https://www.obesityaction.org/resources/yoga-is-not-a-4-letter-word/    DISCLAIMER: To develop an exercise program that best suits your needs, please consult with your physician. It is important to talk with your doctor before beginning any exercise program.    Losing weight and improving your health can often be a difficult journey. You may feel limited to the types of exercises you can perform.  Yoga is an excellent low-impact exercise to help you strengthen your core and increase flexibility. The benefits of yoga can be yours regardless of your size!    What can yoga do for you?  Yoga is a discipline that’s thousands of years old. The main physical benefits of yoga are well documented:    Reduce Stress  Improve Balance  Increase Flexibility  Develop Core Strength    People come to the yoga mat wanting their physical bodies to change. However, it’s the feeling of well-being that brings people back to their yoga practice.    When asked, “What do you do?” I often see the look of disbelief creep across faces as I reply, “I’m a .” As a woman affected by obesity, I do not fit the image of a , marathoner or triathlete. Yet, that is who I am.    I see this same look of disbelief when I tell a person affected by obesity that they can do yoga right now in the body that they have today. Countless times I’ve been told that someone would do yoga, but only after they’ve lost weight. Unfortunately, this eliminates yoga as a tool for reclaiming their health based on their idea that yoga is only for the already thin and flexible. In fact, yoga can be done by everyone -- lying in bed, sitting in a wheelchair or standing only for brief moments, the benefits of yoga can still be yours.    Yoga as a Valuable Tool for Weight-loss  First time yoga students are often surprised at how much energy and effort it takes to come into and hold even the most simple of yoga poses. However, with yoga, it is not just calorie burning that best supports weight-loss.    Often, options to reclaim your health can be overwhelming. What to do is only the beginning of the process, as the answers to “who, where and when” can cloud and confuse the mind -- leading to no action at all. The easiest way to quiet the mind for clearer thinking is do Deep Belly Breathing (see sidebar) and focus on the words “inhale” and “exhale” to  the rhythm of your breath.    Mindfulness is another benefit of yoga that’s often overlooked. Setting your intentions or goals is an important footprint to your success. Some programs require close attention to guidelines to ensure health. Bringing awareness to your life choices, yoga encourages and reminds you to match your actions to your goals.    How to Start Your Yoga Practice  The best way to start a yoga practice is to sprinkle yoga into your daily life. Little pieces of yoga throughout the day will bring you huge benefits with ease - Deep Belly Breathing at a red light or during commercials while watching TV; standing in Mountain pose (see sidebar) while your coffee brews or the microwave is cooking; or gentle seated twists (see sidebar) in front of the computer while it starts or when you find yourself on hold. Taking any movement you have and holding it a little longer, reaching a little farther, will all be beneficial even if you’re confined to a bed.    When you decide it’s time to find a , location is still the first question to answer. You are more likely to be consistent if the class is convenient to your home or workplace. Call or contact the teacher by email to have a chance to talk about what you want and if they have a class that will fit your needs. Remember, you are the customer. Coming early to class will enable you to find a spot that supports your comfort zone. Some of us like to be in the back or close to a wall. Others want to be up front so we can see everything that’s going on.    If that first location isn’t for you, keep trying. Know that the right teacher and the right class are out there for you. Don’t suffer or spend your money on a class that isn’t working for you, but don’t give up either.    Once you’ve found a class, give yourself permission not to do every pose that’s being taught. Listen for what the foundation movement is, as well as the benefit. From that  information, move in a way that makes sense to your body. Go inward and remember -- there’s never any pain in yoga.    Yoga for Everyone!    3-Part Deep Belly Breath    This can be done anywhere and in any position -- seated, standing or even lying down. Breathing through your nose, soften your diaphragm. Inhale deeply expanding your lungs from bottom, middle to top. On the exhale, release from top, middle to bottom. Go slow. Enjoy the expansion of your lung capacity while you improve your cardiovascular exchange. Great for stress reduction -- I especially like to do this while waiting in the doctor’s office.    Simple Seated Twist    Sit so you’re not touching the back of your chair with your feet comfortably apart planted to the floor. On an exhale, reach across your body with your right hand to left knee or leg. The left hand goes back and can be supported on the seat or the back of the chair. Keep the energy of the hips grounded as you feel the twist deepen from hip to top of the head. Your eyes should look left in the direction of the twist. Breathe in shallow breaths that don’t interfere with the squeeze. Keep your belly relaxed. Hold this twist with the spine tall, even though it’s in rotation. Coming out, inhale back to center and twist to the other side. Twists are great to stimulate and cleanse all the organs and soft tissues of the torso.    Standing Mountain Pose    Stand with your feet at true hip-width apart. We often believe our hips are farther apart than they really are. Reach in from the front and find your hipbones. Place your feet directly below your new found hips -- it’s okay if your thighs squeeze! The important thing is to honor the alignment of your frame, not the flesh. Set your feet so the outside edges are parallel. This will make you feel a little pigeon-toed, causing your knees to be soft. (Never stand with locked knees.) Engage both your abdominal and gluteal core. A gentle press of  the shoulder blades will open your heart center and give you a feeling of lightness. You can stand like this anywhere and no one will even know you’re practicing yoga.    To add arm work, on an inhale, lift the arms out to the sides and up overhead framing the head at the ears, palms facing not touching. Hold and feel the energy from your feet to your fingertips. On an exhale, release the arms down and soften the whole body as you release the pose.    My 3 A’s  Yoga can bring to you what I call the Three A’s: Awareness, Acceptance and Affection. As you build your yoga practice, you’ll find yourself aware of your body in a new way. Your body’s edges will become clearer. Your everyday movements will deepen.    From awareness, you’ll begin to notice how different your body is day-to-day, and so begin to accept those differences -- especially the ones you can never change.    Finally, it is my deepest wish that you will come to love your body just as it is in the moment. Please remember, permanent changes come from love, not from hate -- and you deserve to be loved now and always.      Staying Healthy While Traveling    by Annalisa Figueroa RD    OAC at www.obesityaction.org Summer 2023 Resource    It’s time to go on a vacation! Summertime is a popular season for travel, and vacations are a wonderful way to unwind, discover new places, and spend time with family and friends. Taking a break from your daily routine is really important. When you return, you’ll feel refreshed and ready for the next things you need to do! As you pack your bags and plan your trip, remember to stay focused on your health and wellness goals. With a bit of planning, it’s not hard to stay active, eat well, and have a great time during your vacation.    Do Your Research and Plan Ahead  Keeping yourself on track during a vacation might feel like an impossible challenge. However, with the right preparation, it’s entirely achievable. Before you leave,  take some time to plan your vacation. Look for ways to include health and nutrition in your itinerary, such as trying new activities and foods. You’ll be pleasantly surprised to find that many places have great options for staying healthy.    Check your Nutrition  Instead of constantly eating sweets, fried foods and junk, seek out healthier alternatives. Choosing nutritious foods while traveling has many advantages. First, eating healthy will give you more energy, which is important for having a fun vacation. Second, sticking to your plan will give you a sense of accomplishment and satisfaction. Here are some tips to help you choose wisely and prioritize your well-being.    Consider using grocery delivery services. You can shop online from home and have meals and snacks delivered to your hotel. Just make sure your room has a refrigerator. This way, you won’t have to search for a store and can avoid the temptation of unhealthy vacation treats. You can stock up on fresh produce, cheese sticks, popcorn and protein bars to keep in your hotel room.    Make eating out fun by finding new restaurants to try at your destination. Take a moment to look up their menus online and come up with a plan. You can plan to try fresh fish at a local beach restaurant, visit a steakhouse and choose a lean cut of meat with a side salad, or enjoy chicken fajitas at a Mexican restaurant.  Remember to give yourself a break. It’s okay to indulge in a special meal or snack during your vacation. One treat doesn’t mean the whole day is ruined. Just get back on track with your healthy eating at the next meal.    Finding Fitness  Discovering fun fitness activities can help you build a habit that you’ll want to continue. When you’re on vacation, make it a point to set aside time to move your body. This will not only increase your energy levels but also make you feel positive and satisfied about being active. Be creative and think outside the box to  come up with exciting ideas to stay active during your vacation!    Try to find ways to move throughout the day. Look for trails in a local park, a gym at your hotel, or join an exercise class for the day. Keep things interesting by varying your activities and trying something new.  Involve the whole family in staying active. Rent bikes for an afternoon, plan a walk after dinner, or try something different like surfing! You can also let your kids choose an activity. You might be surprised to hear what ideas they have!    Mind Your Mental Health  Vacations are meant to be relaxing and a chance to recharge. Make sure you have a vacation that helps you rest and rejuvenate! Sometimes it’s easy to plan too many activities and take on too many commitments. Take a moment to find balance between fun and fitness.    Pack items you enjoy. Bring your favorite books, puzzles, music, or whatever you love to ensure you have time to do just that. Sneaking away for a few minutes can give you time to spend on your favorite things.  Remember to rest. Vacations can be full of exciting things, but it’s also important to take it easy. Take an afternoon nap or find some quiet time alone to recharge.    Include activities that you enjoy. Vacations offer a lot of things to do, so make sure you have activities that you personally enjoy along with the rest of the family.    How to: Make it Through a Long Day of Travel  Whether you’re taking a road trip for a few hours or a plane across the [place] travel days require some planning. Here are some tips to make your trip easier.    Pack a snack bag for long road trips or airport days. When in the car, consider bringing a cooler with water to avoid sugary snacks at the gas stations. Pack fresh fruits, vegetables, water, cheese and meats. Include snacks like popcorn, pretzels or whole-grain crackers. Airline travel can be more challenging, but you can pack snacks such as protein bars, trail mix or  popcorn in your carry-on. Bring a refillable water bottle to stay hydrated throughout the day!    Find ways to walk, even on your busiest travel day. If you’re driving, take quick 10-15-minute walks every couple of hours. It will not only give you some cardio exercise but also reduce stress and improve your mood during long car rides. If you’re stuck in an airport, walk up and down the terminal while you wait.  Bring items like books, adult coloring books or knitting supplies to keep busy and reduce screen time. This can be a great opportunity to explore a new hobby and let your creative juices flow.  How to: Make Healthy Food Choices When Eating Out  Making healthy food choices while eating out on vacation can be challenging.    Instead of donuts and pastries for breakfast, go for high-protein items like eggs, turkey sausage, turkey mtz, oats and whole-grain toast. Ask for fresh fruit or low-fat yogurt on the side to make breakfast even more nutritious.  Restaurant portions can be large. If someone in your group is willing to split a meal, that can be an easy solution. Since you might not have enough space to take leftovers with you, you may have to leave some behind.  When looking at the lunch or dinner menu, choose grilled, baked or boiled options. This can save you a lot of calories and fat. Be mindful of side dishes, as they can add up quickly. Consider swapping a high-calorie side item for a side salad, fruit or vegetables.    Desserts can be tempting, but plan ahead if you want to order one. Choose a lower-calorie meal to balance it out. You can also share the dessert with others at your table to enjoy a smaller portion.  How to: Deal With an unexpected change of plans  We’ve all been there. Your perfectly planned vacation goes sideways. How do you make the most of these days? It can be easy to throw in the towel, but always be ready with a backup plan.    The weather can change unexpectedly. A rainy day  might ruin your beach or mountain hike plans. Take on the challenge and find new ways to have fun. Look for an indoor pool or an activity center to keep working towards your goals!    Your carefully chosen restaurant may not have healthy options, or your grocery store delivery might be late. Don’t give up; just adapt and make a new decision. Make the best choice you can and move on to the next meal. It’s okay if it’s not perfect!    Sometimes your perfectly planned day doesn’t go as expected. Kids may start fighting during a trip to the park, or someone might get sick on vacation. Focus on the positive moments and enjoy the time you have.  Wherever you go on your summer vacation, make sure to enjoy the break, relaxation and adventure. Taking time away from your usual routine can be amazing, and keeping up with your health and fitness goals can make your vacation even better.    Holiday Weight and How to Avoid It    Obesity Action Coalition by Madelin Kate, PhD  https://www.obesityaction.org/resources/holiday-weight-and-wyx-nd-fqvls-it/      When we think about the holidays many things come to mind - gifts, shopping, parties, family, decorating, long to-do lists --and delicious holiday treats.    Strategies for Avoiding Holiday Weight Gain  The holiday season is a busy time, and there are eating opportunities everywhere we go, such as family gatherings, office parties with trays of home-baked treats in the lunchroom, holiday and [id number] programs at our kids’ schools, treat samples being given away as we make our way through the stores to do our holiday shopping and catalogs in our mailboxes with mouth-watering photo spreads on every page. We’re really busy, perhaps too busy to prepare the healthy meals we might otherwise prepare.    Here are a few tips that will help you negotiate this joyful time with minimum risk to your weight management goals:    Focus on maintaining your current weight. Challenging  yourself to lose weight over the holidays is setting yourself up for failure.  Don’t gorge on any special holiday food because you only get to eat it once a year. With luck, you’ll still be around to enjoy it next year. On the other hand, don’t deprive yourself of anything you want to taste. Instead, take a mindful bite, savoring the sight, taste, aroma, mouth feel and sound of each special holiday treat. Eating like this leads to increased pleasure, quicker satisfaction and decreased risk for weight gain.  Avoid the trap of thinking you can eat what you want because you can just start over in the New Year. It doesn’t get any easier just because it’s January - there are always other reasons to indulge and to celebrate.  Keep up your exercise routine. This will also help reduce holiday stress.  Keep tabs on yourself. Write down what you eat, weigh yourself if you want to or try on your favorite clothes to make sure they still fit.  Create meaning beyond the food by creating new traditions that have nothing to do with food. For example, change “in our family we always have chocolate cinnamon bread with whipped cream on [place] morning” into “in our family we always play in the snow (or on the beach), or go for a long walk/take food and gifts to the homeless shelter on [place] morning.”  Sometimes, eating a particular food is our way of remembering a lost loved one. If that applies to you, find another way to remember them, like sharing memories with family members.  Remember all the reasons why reaching and maintaining a healthy weight is important to you.  Remember, unless you’re an elite athlete, you’re unlikely to be able to “exercise off” weeks of overindulgence.  Strategies for Holiday Parties  Most of us love holiday parties and look forward to them all year. We get to dress up and go to nice places, spend time with our nearest and dearest, enjoy our favorite holiday music and engage in the traditions that  are meaningful to us. Despite all of the excitement, parties can also be minefields when it comes to honoring our healthy lifestyle goals. When we love parties, we may over-indulge as a way of intensifying the positive emotions we’re already feeling, and when we dislike parties, we may over-indulge in an effort to distract ourselves from the emotional discomfort that we’re feeling.    Here are a few tips that will help you get through holiday parties without sabotaging your goals:    Avoid wearing baggy clothing that allows you to expand as you eat.  After you’ve eaten, stay away from the food tables at the party.  Keep your hands busy by finding a way to help out. It’s the best way to distract yourself from the food.  Avoid alcohol. When we drink, we’re more likely to abandon healthy eating.  Fill up with water and other low-calorie drinks.  Take a healthy dish for the pot luck - something you can eat: consider salad, fruit, raw vegetables and a healthy dip.  Focus on your relationships, not on the food - learn to focus on enjoying the people and the special holiday experiences, on building special memories for yourself and your family.  Meeting new people is another good way of distracting yourself from the food. If you’re shy, simply be a good listener.  Plan ahead. The best kind of plan, when it comes to food, is about what you are going to eat - not about what you’re not going to eat. If we focus on what we can’t eat (or what we think we shouldn’t eat), this kind of thinking can set us up for failure because it simply leaves us feeling deprived.  Don’t arrive completely famished - you’ll be more likely to eat in a way you’ll later regret. Plan to eat on the light side both before and after the event. Think about your meal plan for the day, and leave yourself some room to eat at the party.  Coping with Holiday Stress  As you know, the holiday season can be joyful and stressful at the same time. There’s so much  to do, being around family can sometimes be difficult and often, we set ourselves the goal of creating the “perfect” holiday. Being stressed puts us at risk for stress-based eating in an effort to cope.    Here are some strategies you can use to reduce your stress levels:    Focus on what you’re grateful for.  Practice deep breathing whenever you feel overwhelmed.  Keep up your exercise routine.  Remind yourself to do just one thing at a time.  Remember -- you cannot do more than your best.  Be willing to say “no” to some events, tasks or requests. Sometimes this is the best way we can take care of ourselves.  Create a holiday season schedule for yourself. Schedule and prioritize everything you need to get done.  Reduce your expectations - aim for “good enough,” not “perfect.”  If you’re alone during the holidays, pamper yourself and find a way to help others who are less fortunate. This will help reduce your loneliness.  If your relationships with family members are strained, remember that over-indulging in your favorite holiday comfort foods is not going to change how they behave towards you!  Create fun times for yourself. Having fun is a great way of reducing stress!  I hope these tips will help you not just get through the holidays, but that they’ll allow you to feel reassured that you can still have a fun and meaningful time without having to sacrifice your weight management goals. Wishing you a happy, healthy and meaningful holiday season!         Medication use and SEs reviewed with patient.    Return in about 4 months (around 1/16/2025) for weight management via clinic or VV.    Answers submitted by the patient for this visit:  Medical Weight Loss Follow Up (Submitted on 9/16/2024)  If greater than 5/1O how would you grade your coping mechanisms?: moderate

## (undated) NOTE — LETTER
[facility] [address] [place] [address], [id number] [facility] [phone]            December 9, 2021      Sherice Greenwood  [facility] [id number]      Dear Ms. Levi Molina

## (undated) NOTE — MR AVS SNAPSHOT
[address] [phone]               Thank you for choosing us for your health care visit with Marcella Steven MD.  We are glad to serve you and happy to provide you with this summary of Take 1 capsule (15 mg total) by mouth every morning. What changed:  Another medication with the same name was added. Make sure you understand how and when to take each.            * Phentermine HCl 37.5 MG Tabs   Take 1 tablet (37.5 mg total) by mouth crow

## (undated) NOTE — MR AVS SNAPSHOT
[address] [phone]               Thank you for choosing us for your health care visit with José Miguel Mata MD.  We are glad to serve you and happy to provide you with this summary of Take 2 tablets by mouth 2 (two) times daily. Commonly known as:  CONTRAVE           * Phentermine HCl 37.5 MG Tabs   Take 1 tablet (37.5 mg total) by mouth every morning before breakfast.   What changed:  Another medication with the same name was added.

## (undated) NOTE — MR AVS SNAPSHOT
[address] [phone]               Thank you for choosing us for your health care visit with Skip Schwarz MD.  We are glad to serve you and happy to provide you with this summary of hydrochlorothiazide 25 MG Tabs   TAKE 1 TABLET DAILY   Commonly known as:  HYDRODIURIL           Levothyroxine Sodium 100 MCG Tabs   Take 1 tablet (100 mcg total) by mouth once daily.    Commonly known as:  SYNTHROID           Naltrexone-Bupropion HCl ER 8

## (undated) NOTE — LETTER
[address] [place] [address], [address] [place] [address], [address] [place] [address]            March 15, 2019      [facility] [id number]      Dear Ms. Anh Ritter

## (undated) NOTE — LETTER
08/10/18        Tonya Campos  [address]      Dear Denise Hernandez records indicate that you have outstanding lab work and or testing that was ordered for you and has not yet been completed:        COMP METABOLIC PANEL      TSH

## (undated) NOTE — Clinical Note
She is seeing you for f/u on hypothyroid and low iron stores. Started Vitamin B12 injection today, plan monthly.

## (undated) NOTE — Clinical Note
Patient was seen for their f/u at [facility] with a total weight loss of 42# since initial consult. Transfer from Dr. Amanda Betancourt.

## (undated) NOTE — MR AVS SNAPSHOT
[address] [phone]               Thank you for choosing us for your health care visit with Neville Lowe MD.  We are glad to serve you and happy to provide you with this summary of Commonly known as:  FLONASE           Levothyroxine Sodium 100 MCG Tabs   Take 1 tablet (100 mcg total) by mouth once daily. Commonly known as:  SYNTHROID           Naltrexone-Bupropion HCl ER 8-90 MG Tb12   Take 2 tablets by mouth 2 (two) times daily.